# Patient Record
Sex: MALE | Race: WHITE
[De-identification: names, ages, dates, MRNs, and addresses within clinical notes are randomized per-mention and may not be internally consistent; named-entity substitution may affect disease eponyms.]

---

## 2018-06-18 NOTE — DIAGNOSTIC IMAGING REPORT
FOOT RIGHT COMPLETE - 3 views



HISTORY:  Pain. Bilaterally infected right.

COMPARISON: None available.

     

FINDINGS:

Bones:

No acute displaced fracture.  

Osseous alignment is within normal limits.

Plantar calcaneal enthesophyte.

Erosive changes of the great toe phalanx.



Joints:

The joint spaces are well-maintained.



Soft tissues:

Soft tissue defect in the great toe.





IMPRESSION: 

Erosive changes of the great toe phalanx with overlying soft tissue defect,

consistent with osteomyelitis.



Signed by: Dr. Guilherme Alicia M.D. on 6/18/2018 8:42 PM

## 2018-06-19 NOTE — CONSULTATION
DATE OF CONSULTATION:  June 19, 2018 



PODIATRY SURGERY CONSULTATION 



REASON FOR CONSULTATION:  Osteomyelitis right hallux. I am covering for Dr. Dinesh Muller.



HISTORY OF PRESENT ILLNESS:  Patient has been diabetic for many years. He 

says he has been noncompliant. He has been having daily drinks; so, it has 

really increased his hemoglobin A1c. He thinks it is probably running at 

around a 10. He says Dr. Marquez is his endocrinologist and Dr. Cardenas his 

PCP. He says he has not seen Dr. Cardenas in a long time. He usually follows 

up with Dr. Marquez, but he did miss the last appointment. Patient presented 

through the emergency room with an infected right hallux.



ALLERGIES:  NO KNOWN ALLERGIES.



MEDICATION:  Please refer to MAR. Of interest to this consult are Zosyn and 

vancomycin.



REVIEW OF SYSTEMS:  Noncontributory except for a full-thickness ulcer, bone 

exposed, distal phalanx to the right hallux.



LOWER EXTREMITY PHYSICAL EXAMINATION:  Pedal pulses are palpable. Capillary 

filling time is delayed. There is some pedal hair growth. It is scant. Skin 

is atrophic. Intrinsic-minus type of foot. Full-thickness ulcer to the 

distal aspect of the right foot with distal phalanx exposed. There are 

localized erythema and edema. No streaking erythema, no erythema, no 

ascending lymphangitis. At this point the base is 100% fibrotic. The bone 

is exposed. The wound appears soft to touch. There is malodor to the area. 

Protective threshold is absent. 



X-RAY 3 VIEWS:  Erosive changes of the great toe phalanx with overlying 

soft-tissue defect consistent with osteomyelitis.



ASSESSMENT

1. Ulcer grade 3, right hallux.

2. Diabetes with neuropathy.

3. Peripheral vascular disease.

4. History of noncompliance.



PLAN:   At this point I discussed with patient that he needs an amputation 

of the right distal phalanx of the hallux. He agrees with this. I discussed 

with him possible amputation on Thursday morning. He will be 

nonweightbearing. I discussed with him that normal healing times could take 

4 to 6 weeks for it to heal. He has to be compliant with his diabetes. He 

says he is going to stop drinking now. He is going to comply with getting 

his diabetes under control. I discussed no pressure to the area and 

compliance with antibiotics. I am tentatively going to put him for surgery 

for Thursday in the morning. He will be n.p.o. on Wednesday night.





 





DD:  06/19/2018 10:57

DT:  06/19/2018 15:01

Job#:  [id number]

## 2018-06-19 NOTE — DIAGNOSTIC IMAGING REPORT
PROCEDURE:

Frontal and lateral views of the chest.

 

COMPARISON: Patients [facility], CT, CTA CHEST W, 3/18/2012, 5:15.

 

INDICATIONS:   PREOP AMPUTATION OF 1ST DIGIT TOE

     

FINDINGS:

Lines/tubes:  None.

 

Lungs: The lungs are mildly hypoinflated. Linear opacity in the right 

middle lobe likely reflect subsegmental atelectasis. No consolidation 

or pulmonary edema.

 

Pleura:  There is no pleural effusion or pneumothorax.

 

Heart and mediastinum: Stable mild cardiomegaly. Pulmonary vasculature 

is normal.

 

Bones:  No acute bony abnormality. Degenerative changes in the thoracic 

spine.

 

IMPRESSION: 

 

1. mild cardiomegaly and mildly hypoinflated lungs, without acute 

cardiopulmonary disease.

 

 

Nam Hardin M.D.  

Dictated by:  Nam Hardin M.D. on 6/19/2018 at 16:20     

Electronically approved by:  Nam Hardin M.D. on 6/19/2018 at 

16:20

## 2018-06-19 NOTE — HISTORY AND PHYSICAL
CLINICAL HISTORY:  This is a 58-year-old white man known to me from 

previous evaluation, admitted via the emergency room because of right 1st 

toe osteomyelitis and cellulitis.



This patient is known to have coronary artery disease, status post previous 

anterior wall myocardial infarction.  Additionally, he has alcoholic 

cardiomyopathy.  At the time of cardiac catheterization in , his mid 

LAD was occluded, diagonal had 80% stenosis, circumflex had sequential 60 

and 80% stenosis, OM had 40% stenosis.  There were some plaques in the 

right coronary artery which were crooked and small.  He had 3+ mitral 

regurgitation.  Subsequently echocardiogram elsewhere apparently showed 

ejection fraction had dropped to 20%.  Nevertheless, the patient has gone 

off all his medications, Lasix, metoprolol, and lisinopril apparently 

because of low blood pressure.  He was taking digoxin instead.  However, 

according to him, he has not been taking any medications for approximately 

2 months including his Glucophage.  He does use this insulin long acting 

glargine-based 25 units twice a day.  He is to take 80 but apparently has 

decreased his intake.



He is a heavy drinker and continues to drink.  He also has been smoking for 

the past 2 years for a total of 17 years of smoking after quitting for a 

while.  He has history of hypertension, hyperlipidemia, but he does not 

take any medications now since his blood pressure is low.



PERSONAL SOCIAL HISTORY:  He was a chemistry major, working in the computer 

field. His wife had severe Alzheimer's.  He smokes and drinks.



FAMILY HISTORY:  Father  of myocardial infarction, age 43.  Mother had 

Alzheimer's.  Sister  in her sleep from sleep apnea or morbid obesity.  

Another sister is alive with diabetes.



REVIEW OF SYSTEMS:  Noncontributory.



PHYSICAL EXAMINATION

GENERAL:  He is alert and coherent.

VITAL SIGNS:  Stable.

CARDIAC:  Jugular veins are not distended.  S1 and S2 were regular.  There 

is no appreciable murmur.

LUNGS:  Clear.

ABDOMEN:  Soft.  Bowel sounds are present.

EXTREMITIES:  No cyanosis, clubbing, or edema.  He has a nonhealing chronic 

wound in his right toe, apparently has been ongoing for 1 month with 

surrounding cellulitis.



LABORATORY STUDIES:  The x-ray of the foot showed osteomyelitis.



IMPRESSIONS

1. Osteomyelitis of the right 1st toe with soft tissue breakdown for 

approximately a month with surrounding cellulitis.

2. Compensated congestive heart failure, ejection fraction in the range 

of 20%.  In 2011, it was in the range of 35 to 40%.

3. Ischemic and alcoholic cardiomyopathy.

4. Coronary artery disease status post occluded mid left anterior 

descending artery with additional disease in the distal circumflex as 

well as diagonal artery.  Not evaluated invasively since .

5. Moderately severe mitral regurgitation.

6. Diabetes.

7. Hyperlipidemia.

8. Hypotension with history of hypertension.

9. Noncompliance with medications.

10. Alcoholism with elevated liver function but reportedly liver biopsy 

was negative for cirrhosis.

11. Hyponatremia related to alcohol abuse.

12. The patient states that he has never had alcohol withdrawal symptoms 

when he stopped drinking in the past.



RECOMMENDATIONS

1.  Repeat echocardiogram.  If there is evidence of intracardiac thrombus 

or even if there is not, we will still consider anticoagulation.  However, 

the patient is unlikely to comply and his liver problems does not help.  In 

the past, there was concern about apical mural thrombus.  Anticoagulation, 

however, in this patient may be difficult due to his alcoholism and liver 

condition.

2.  He will require infectious disease and podiatry consultation.  The 

risks from the cardiac standpoint for the planned toe amputation is quite 

high due to his severe cardiomyopathy.  Nevertheless, he appears to be 

quite compensated and still working.  The risks from a cardiac standpoint 

have been explained to him and understood.  All questions were answered.  

Most likely, we will proceed with toe amputation.









DD:  2018 10:27

DT:  2018 10:39

Job#:  [id number]



cc:DYLAN GARRIDO DPM

## 2018-06-19 NOTE — CONSULTATION
DATE OF CONSULTATION:  June 19, 2018 



REASON FOR CONSULTATION:  Osteomyelitis.



Thank you, Dr. Villegas, for asking me to see this patient.



HISTORY:  The patient is a 58-year-old man referred for osteomyelitis.  He 

was admitted through the emergency department with cellulitis and 

osteomyelitis of the right great toe.  He presented to the emergency 

department on June 18, 2018, with black discoloration of the right great 

toe.  The patient sustained a cut of the tip of the right great toe about 1 

month ago.  The next morning when he woke up, he noted that his puppy had 

chewed off the skin from the wound.  The patient managed himself with 

hydrogen peroxide soaks and topical antibiotics.  Unfortunately, the wound 

deteriorated with black discoloration of the distal end of the right great 

toe associated with redness of the right forefoot.  The patient denies 

fever, chills and pain.  In the emergency department, he was noted to have 

a temperature of 98.3 degrees Fahrenheit, pulse 102, respiratory rate 20, 

blood pressure 156/85.  Right foot x-ray showed "erosive changes of the 

great toe phalanx" with overlying soft tissue defect consistent with 

osteomyelitis.  The patient was evaluated by the podiatry service, and 

surgery has been planned for Thursday.  



PAST MEDICAL HISTORY:  Diabetes mellitus, type 2, hypertension, 

hyperlipidemia, coronary artery disease, myocardial infarction, mitral 

regurgitation, alcoholic cardiomyopathy, ejection fraction 20%, and 

congestive heart failure.  



PAST SURGICAL HISTORY:  Excision of benign duodenal tumor.



ALLERGIES:  NO KNOWN DRUG ALLERGIES. 



MEDICATIONS:  The current antibiotics are Zosyn 3.375 g IVPB q.8 h. and 

vancomycin 1 g IVPB q.12 h.



IMMUNIZATIONS:  The patient had received tetanus vaccination within 10 

years.  He does not recall receiving pneumococcal vaccination. 



FAMILY HISTORY:  Noncontributory.



SOCIAL HISTORY:  The patient smokes cigarettes every day and vague about 

quantity.  Has been a chronic smoker with intermittent periods of 

cessation.  Also, he drinks alcohol heavily, but has stopped intermittently 

in the past.  



REVIEW OF SYSTEMS:  As per HPI.  



PHYSICAL EXAMINATION 

GENERAL:  No acute distress. 

VITAL SIGNS: T-max 99.3, pulse 85, respiratory rate 17, blood pressure 

122/80, weight 227 pounds.  

HEENT:  Normocephalic.  There is no icterus.  No injection of conjunctivae. 

 There is no ear or nasal discharge.  Moist oral mucosa.  No pharyngeal 

erythema elicited.

NECK:  Supple.  No lymphadenopathy or meningismus.

LUNGS:  Clear to auscultation bilaterally.

HEART:  Normal S1 and S2.  

ABDOMEN:  Soft and nontender.  

EXTREMITIES:  There is black discoloration of the distal phalanx of the 

right great toe with erythema extending from the proximal right great toe 

to the right forefoot.  Dorsalis pedis and posterior tibial pulses are weak 

to palpation in both feet.  There is trace edema of the right foot.  There 

is no edema, clubbing or cyanosis of the rest of the extremities.

SKIN:  As per extremities.  

CNS:  Awake, alert and oriented to person, place and time.  There is 

decreased sensation on examination of the feet bilaterally.  Nonfocal.





LABORATORY AND DIAGNOSTICS:  WBC is 5180, hemoglobin 15.4 and platelets 

173,000.  Neutrophils 67.5, lymphs 19.3, monos 9.3, eosinophils 2.7, 

basophils 0.8.  BUN 8, creatinine 0.95, AST 55, ALT 80, alk phos 59, total 

bilirubin 1.1, blood glucose 149.  



IMPRESSION 

1.  Diabetic foot infection present on admission.

2.  Cellulitis of the right great toe present on admission.

3.  Osteomyelitis of the distal phalanx of the right great toe present on 

admission.

4.  Abnormal liver enzymes.

5.  Questionable of peripheral arterial disease. 

6.  Alcoholic cardiomyopathy.

7.  Tobacco use disorder.

8.  Alcohol abuse.  



PLAN 

1.  I agree with planned surgery.

2.  Check wound culture and arterial Doppler ultrasound if not yet done. 

3.  Continue current antibiotics and administer Pneumovax.  Prevnar 13 is 

not available at this facility.  

4.  Smoking cessation counseling has been provided to the patient and the 

patient is currently on nicotine gum.  Also, the patient has been counseled 

to quit drinking alcohol.  



 





DD:  06/19/2018 18:53

DT:  06/19/2018 19:02

Job#:  [id number]

## 2018-06-21 NOTE — OPERATIVE REPORT
DATE OF PROCEDURE:  June 19, 2018 



ASSISTANT:  None



PREOPERATIVE DIAGNOSIS:  Osteomyelitis, right distal phalanx.



POSTOPERATIVE DIAGNOSIS:  Osteomyelitis, right distal phalanx.



PROCEDURES 

1.  Amputation of distal phalanx, right foot.

2.  Application of posterior splint.



PATHOLOGY:  Culture and sensitivity of the bone and tissue sent for 

pathology.



ANESTHESIA:  Local anesthesia with MAC.



HEMOSTASIS:  None.



ESTIMATED BLOOD LOSS:  10 mL.



MATERIALS:  None.



INJECTABLES:  0.5% Marcaine plain given preop.



COMPLICATIONS:  None.



CONDITION:  Stable. 



PROCEDURE IN DETAIL:  Under mild sedation, the patient was brought to the 

operating room and placed on the operating table in the supine position.  

Following IV sedation, IV anesthesia was obtained.  At this point, the 

right foot was scrubbed, prepped and draped in the usual aseptic manner.  

The leg was then lowered to the table.

Attention was then directed to the dorsal aspect of the right foot where 

the bone at the distal phalanx was exposed.  At this point, utilizing sharp 

technique, the IPJ was removed.  Culture and sensitivity of the bone were 

taken and was sent for pathology.  At this point, a plantar flap was then 

used to close the area after copious irrigation with Bacitracin.  The flap 

was then closed with 4-0 nylon.



Ointment, Adaptic, 4 x 4's, Kerlix, Webril, and posterior splint was 

applied, and was secured utilizing an Ace bandage.



The patient tolerated the procedure and anesthesia well without 

complications.  The patient was transported back to stepdown unit.  He will 

return back to his room.  At this point, he is not weightbearing to the 

lower extremity.  I discussed with his compliance.  I have counseled him on 

the use of alcohol use.  He is aware that this is going to impair his 

healing if he continues to drink and not compliant with his diabetes 

management.  If discharged, he will follow up in the office.  Plan to 

change the dressing in about 3-5 days.  









DD:  06/21/2018 09:10

DT:  06/21/2018 09:36

Job#:  [id number]

## 2018-06-21 NOTE — CARDIOLOGY REPORT
DATE OF STUDY:    



DOPPLER SCAN OF LOWER EXTREMITY ARTERIES



The lower extremity arteries bilaterally showed triphasic and biphasic 

waveforms throughout.  Velocity is elevated in the midleft posterior tibial 

artery at 3.3 meters per second consistent with high-grade stenosis. 



CONCLUSION

1.  High-grade stenosis involving the midleft posterior tibial artery with 

velocity of 3.3 meters per second.  

2.  No high-grade stenosis elsewhere bilaterally in the major arteries of 

the lower extremities.









DD:  06/21/2018 09:09

DT:  06/21/2018 09:15

Job#:  [id number]



cc:HAILY VILLALPANDO MD

## 2018-06-21 NOTE — CARDIOLOGY REPORT
DATE OF STUDY:    



ECHOCARDIOGRAM  



M-MODE:  Dilated left atrium and left ventricle.  Severely diminished left 

ventricular contractility.  [place] appears to be akinetic.  Mitral and aortic 

valves are grossly normal.  There is no pericardial effusion.



SECTOR SCAN:  Dilated left atrium and right ventricle.  Severely diminished 

left ventricular contractility.  [place] is akinetic.  Mitral, aortic and 

tricuspid valves are grossly normal.  There is no pericardial effusion.



CARDIAC DOPPLER STUDY WITH COLOR:  Trace mitral, pulmonic and tricuspid 

regurgitation.



CONCLUSION

1.  Dilated left ventricle with severely diminished left ventricular 

contractility with apex akinetic. Ejection fraction is approximately 20%.

2.  Trace mitral regurgitation with dilated left atrium.

3.  Trace tricuspid and pulmonic regurgitation.

4.  Suboptimal study.  









DD:  06/21/2018 09:08

DT:  06/21/2018 09:12

Job#:  [id number]

## 2018-06-22 NOTE — DISCHARGE SUMMARY
CLINICAL HISTORY: This is a 58-year-old white man known to me from previous 

evaluation admitted via the emergency room because of right 1st toe 

osteomyelitis and cellulitis.  Please refer to my previous dictation 

concerning details of current illness, past medical history, personal and 

social history, family history, review of systems, physical examination, 

initial laboratory studies.



HOSPITAL COURSE:  The patient was seen in consultation by infectious 

disease consultant, Dr. Dionte Harris and by podiatry, Dr. Edna Reyes.  

Surgery was recommended and was carried out without difficulties under MAC 

and local anesthesia.  His ejection fraction was 25% by echo making him 

high risk.  His is known to have coronary artery disease, as well as 

possible alcoholic cardiomyopathy.  The cultures grew MRSA and E. coli.  

The patient was treated with vancomycin, piperacillin/tazobactam 

combination.  He never had fever or elevated white count with white count 

around 5000 range.  He is anxious to go home.  He is discharged with 

approval of infectious disease and podiatry to be followed on an outpatient 

basis.  Antibiotic outpatient coverage will be given by Dr. Dionte Harris.  



DISCHARGE DIAGNOSES 

1.  Osteomyelitis and cellulitis on the right 1st toe successfully treated 

with toe amputation under local anesthesia and monitored anesthesia care, 

and with intravenous antibiotics using vancomycin and piperacillin and 

tazobactam.  Bacteria was methicillin-resistant Staphylococcus 

aureus/Escherichia coli.

2.  Compensated congestive heart failure with ejection fraction of 25% with 

previous ejection fraction in 2011 of 35% to 40%.

3.  Ischemic and alcoholic cardiomyopathy.

4.  Coronary artery disease, status post occluded midleft ventricular 

descending coronary artery with additional disease in the distal circumflex 

and diagonal artery treated medically since 2011.

5.  Moderately severe mitral regurgitation.

6.  Diabetes.  

7.  Hyperlipidemia.

8.  Hypotension.

9.  Noncompliance with medications and followup.

10. Alcoholism with elevated liver function with AST of 71, ALT of 99.

11. Hyponatremia due to alcoholism with original sodium 129.  At discharge, 

sodium was 137.  

12. No evidence of alcohol withdrawal as the patient had predicted when he 

stops drinking alcohol.  





 



 _________________________________

MIKAYLA YAÑEZ MD



DD:  06/22/2018 10:19

DT:  06/22/2018 10:40

Job#:  [id number]



cc:GERMAINE DIOP M.D.

## 2018-07-16 ENCOUNTER — HOSPITAL ENCOUNTER (INPATIENT)
Dept: HOSPITAL 88 - ER | Age: 58
LOS: 10 days | Discharge: HOME HEALTH | DRG: 617 | End: 2018-07-26
Attending: INTERNAL MEDICINE | Admitting: INTERNAL MEDICINE
Payer: COMMERCIAL

## 2018-07-16 VITALS — DIASTOLIC BLOOD PRESSURE: 75 MMHG | SYSTOLIC BLOOD PRESSURE: 130 MMHG

## 2018-07-16 VITALS — BODY MASS INDEX: 32.9 KG/M2 | WEIGHT: 235 LBS | HEIGHT: 71 IN

## 2018-07-16 DIAGNOSIS — E11.621: ICD-10-CM

## 2018-07-16 DIAGNOSIS — E11.65: ICD-10-CM

## 2018-07-16 DIAGNOSIS — I25.2: ICD-10-CM

## 2018-07-16 DIAGNOSIS — I42.6: ICD-10-CM

## 2018-07-16 DIAGNOSIS — I34.0: ICD-10-CM

## 2018-07-16 DIAGNOSIS — E11.69: Primary | ICD-10-CM

## 2018-07-16 DIAGNOSIS — Z16.24: ICD-10-CM

## 2018-07-16 DIAGNOSIS — E11.51: ICD-10-CM

## 2018-07-16 DIAGNOSIS — F17.210: ICD-10-CM

## 2018-07-16 DIAGNOSIS — L97.412: ICD-10-CM

## 2018-07-16 DIAGNOSIS — I11.0: ICD-10-CM

## 2018-07-16 DIAGNOSIS — I25.10: ICD-10-CM

## 2018-07-16 DIAGNOSIS — I50.9: ICD-10-CM

## 2018-07-16 DIAGNOSIS — E78.5: ICD-10-CM

## 2018-07-16 DIAGNOSIS — Z91.14: ICD-10-CM

## 2018-07-16 DIAGNOSIS — Z79.4: ICD-10-CM

## 2018-07-16 DIAGNOSIS — B96.89: ICD-10-CM

## 2018-07-16 DIAGNOSIS — B95.62: ICD-10-CM

## 2018-07-16 DIAGNOSIS — Z98.61: ICD-10-CM

## 2018-07-16 DIAGNOSIS — Z89.411: ICD-10-CM

## 2018-07-16 DIAGNOSIS — F10.20: ICD-10-CM

## 2018-07-16 DIAGNOSIS — L03.115: ICD-10-CM

## 2018-07-16 DIAGNOSIS — B95.2: ICD-10-CM

## 2018-07-16 DIAGNOSIS — M86.171: ICD-10-CM

## 2018-07-16 LAB
ALBUMIN SERPL-MCNC: 2.9 G/DL (ref 3.5–5)
ALBUMIN/GLOB SERPL: 0.7 {RATIO} (ref 0.8–2)
ALP SERPL-CCNC: 63 IU/L (ref 40–150)
ALT SERPL-CCNC: 30 IU/L (ref 0–55)
ANION GAP SERPL CALC-SCNC: 18.7 MMOL/L (ref 8–16)
BASOPHILS # BLD AUTO: 0 10*3/UL (ref 0–0.1)
BASOPHILS NFR BLD AUTO: 0.2 % (ref 0–1)
BUN SERPL-MCNC: 10 MG/DL (ref 7–26)
BUN/CREAT SERPL: 11 (ref 6–25)
CALCIUM SERPL-MCNC: 8.9 MG/DL (ref 8.4–10.2)
CHLORIDE SERPL-SCNC: 87 MMOL/L (ref 98–107)
CO2 SERPL-SCNC: 20 MMOL/L (ref 22–29)
DEPRECATED NEUTROPHILS # BLD AUTO: 10.4 10*3/UL (ref 2.1–6.9)
EGFRCR SERPLBLD CKD-EPI 2021: > 60 ML/MIN (ref 60–?)
EOSINOPHIL # BLD AUTO: 0 10*3/UL (ref 0–0.4)
EOSINOPHIL NFR BLD AUTO: 0.1 % (ref 0–6)
ERYTHROCYTE [DISTWIDTH] IN CORD BLOOD: 13.2 % (ref 11.7–14.4)
GLOBULIN PLAS-MCNC: 4.3 G/DL (ref 2.3–3.5)
GLUCOSE SERPLBLD-MCNC: 131 MG/DL (ref 74–118)
HCT VFR BLD AUTO: 37.3 % (ref 38.2–49.6)
HGB BLD-MCNC: 13.4 G/DL (ref 14–18)
LYMPHOCYTES # BLD: 0.6 10*3/UL (ref 1–3.2)
LYMPHOCYTES NFR BLD AUTO: 4.7 % (ref 18–39.1)
MCH RBC QN AUTO: 31.1 PG (ref 28–32)
MCHC RBC AUTO-ENTMCNC: 35.9 G/DL (ref 31–35)
MCV RBC AUTO: 86.5 FL (ref 81–99)
MONOCYTES # BLD AUTO: 1.2 10*3/UL (ref 0.2–0.8)
MONOCYTES NFR BLD AUTO: 9.8 % (ref 4.4–11.3)
NEUTS SEG NFR BLD AUTO: 84.5 % (ref 38.7–80)
PLATELET # BLD AUTO: 155 X10E3/UL (ref 140–360)
POTASSIUM SERPL-SCNC: 3.7 MMOL/L (ref 3.5–5.1)
RBC # BLD AUTO: 4.31 X10E6/UL (ref 4.3–5.7)
SODIUM SERPL-SCNC: 122 MMOL/L (ref 136–145)

## 2018-07-16 PROCEDURE — 80053 COMPREHEN METABOLIC PANEL: CPT

## 2018-07-16 PROCEDURE — 97605 NEG PRS WND THER DME<=50SQCM: CPT

## 2018-07-16 PROCEDURE — 87186 SC STD MICRODIL/AGAR DIL: CPT

## 2018-07-16 PROCEDURE — 96372 THER/PROPH/DIAG INJ SC/IM: CPT

## 2018-07-16 PROCEDURE — 88311 DECALCIFY TISSUE: CPT

## 2018-07-16 PROCEDURE — 81001 URINALYSIS AUTO W/SCOPE: CPT

## 2018-07-16 PROCEDURE — 99284 EMERGENCY DEPT VISIT MOD MDM: CPT

## 2018-07-16 PROCEDURE — 36569 INSJ PICC 5 YR+ W/O IMAGING: CPT

## 2018-07-16 PROCEDURE — 71046 X-RAY EXAM CHEST 2 VIEWS: CPT

## 2018-07-16 PROCEDURE — 87205 SMEAR GRAM STAIN: CPT

## 2018-07-16 PROCEDURE — 87102 FUNGUS ISOLATION CULTURE: CPT

## 2018-07-16 PROCEDURE — 82948 REAGENT STRIP/BLOOD GLUCOSE: CPT

## 2018-07-16 PROCEDURE — 93005 ELECTROCARDIOGRAM TRACING: CPT

## 2018-07-16 PROCEDURE — 87071 CULTURE AEROBIC QUANT OTHER: CPT

## 2018-07-16 PROCEDURE — 80202 ASSAY OF VANCOMYCIN: CPT

## 2018-07-16 PROCEDURE — 80048 BASIC METABOLIC PNL TOTAL CA: CPT

## 2018-07-16 PROCEDURE — 82805 BLOOD GASES W/O2 SATURATION: CPT

## 2018-07-16 PROCEDURE — 87040 BLOOD CULTURE FOR BACTERIA: CPT

## 2018-07-16 PROCEDURE — 97607 NEG PRS WND THR NDME<=50SQCM: CPT

## 2018-07-16 PROCEDURE — 71045 X-RAY EXAM CHEST 1 VIEW: CPT

## 2018-07-16 PROCEDURE — 87206 SMEAR FLUORESCENT/ACID STAI: CPT

## 2018-07-16 PROCEDURE — 0Y6M0Z9 DETACHMENT AT RIGHT FOOT, PARTIAL 1ST RAY, OPEN APPROACH: ICD-10-PCS | Performed by: PODIATRIST

## 2018-07-16 PROCEDURE — 83605 ASSAY OF LACTIC ACID: CPT

## 2018-07-16 PROCEDURE — 88305 TISSUE EXAM BY PATHOLOGIST: CPT

## 2018-07-16 PROCEDURE — 85025 COMPLETE CBC W/AUTO DIFF WBC: CPT

## 2018-07-16 PROCEDURE — 87075 CULTR BACTERIA EXCEPT BLOOD: CPT

## 2018-07-16 PROCEDURE — 36415 COLL VENOUS BLD VENIPUNCTURE: CPT

## 2018-07-16 PROCEDURE — 97139 UNLISTED THERAPEUTIC PX: CPT

## 2018-07-16 PROCEDURE — 36600 WITHDRAWAL OF ARTERIAL BLOOD: CPT

## 2018-07-16 PROCEDURE — 87086 URINE CULTURE/COLONY COUNT: CPT

## 2018-07-16 PROCEDURE — 93925 LOWER EXTREMITY STUDY: CPT

## 2018-07-16 RX ADMIN — SODIUM CHLORIDE SCH MLS/HR: 9 INJECTION, SOLUTION INTRAVENOUS at 18:00

## 2018-07-16 RX ADMIN — AMLODIPINE BESYLATE SCH MG: 5 TABLET ORAL at 21:05

## 2018-07-16 RX ADMIN — INSULIN LISPRO SCH UNIT: 100 INJECTION, SOLUTION INTRAVENOUS; SUBCUTANEOUS at 20:56

## 2018-07-16 RX ADMIN — TAZOBACTAM SODIUM AND PIPERACILLIN SODIUM SCH MLS/HR: 375; 3 INJECTION, SOLUTION INTRAVENOUS at 23:41

## 2018-07-16 NOTE — HISTORY AND PHYSICAL
CLINICAL HISTORY:  This is a 58-year-old white man known to me from 

previous evaluations admitted via the emergency room because of recurrent 

right toe cellulitis extending into the foot.



This patient was hospitalized 2018 with osteomyelitis and cellulitis 

of the 1st toe.  He underwent successful toe amputation by Dr. Reyes and 

was seen by Dr. Dionte Harris of infectious disease.  He was released on 

oral antibiotics to be taken for 20 days, on 18.  However, the 

patient states that for at least 10 days he has not been taking any 

antibiotics. He says he has seen Dr. Reyes twice and she was satisfied with 

his healing.  Additionally, home health has been coming twice a week to his 

home and changed the bandage apparently without noticing any problems.  Two 

days prior to admission he noticed a small pimple in the right toe and then 

this morning he noticed it was infected.   He denies any fever or chills.  

He decided to come to the emergency room and was found to have fulminant 

infection with abscess drainage.  He is being admitted for further 

evaluation and treatment.



PAST MEDICAL HISTORY:  Remarkable for coronary artery disease and cardiac 

catheterization in  showed occluded mid LAD with collaterals, 80% 

proximal diagonal artery stenosis and 80% distal circumflex stenosis.  

Ejection fraction was 40% and prior to that his ejection fraction has been 

as low as 20%, thought to be due to alcohol as well as ischemic 

cardiomyopathy.  He has history of diabetes, hypertension, hypotension, 

hyperlipidemia and noncompliance as well as moderate mitral regurgitation 

which may be exacerbated by catheter positioning. 



HOME MEDICATIONS:  Amlodipine 5 mg b.i.d., (?) lisinopril, (?) digoxin, 

metformin as well as metoprolol succinate, Lasix and potassium.



PERSONAL/SOCIAL HISTORY:  He is a heavy drinker, 10-20 beers per day.  He 

continues to smoke 1 to 2 packs per day.  He works as a computer programer  

He has a degree in chemistry.  His wife lives at home with severe Alzheimer 

disease.  He has a full-time live-in aide.  



FAMILY HISTORY:  Father  from myocardial infarction at age 43.  Mother 

had Alzheimer disease.  Sister  in her sleep from sleep apnea and 

morbid obesity.  Another sister is alive with diabetes.  



REVIEW OF SYSTEMS:  Noncontributory. 



PHYSICAL EXAMINATION:   

VITAL SIGNS:  Stable.  Temperature high was 100.3 at the time of 

presentation.  Blood pressure 135/75, pulse oximetry 98%.  Pulse 117.  

CARDIOVASCULAR:  Jugular veins are not distended.  S1 and S2 were regular.  

There no appreciable murmurs. 

RESPIRATORY:  Clear. 

ABDOMEN:  Soft.  Bowel sounds present. 

EXTREMITIES:  No cyanosis, clubbing or edema. 



LABORATORY STUDIES:  White count 4300, hemoglobin 13.4, platelet count 

155,000.  Sodium is 122.  Potassium 3.7.  Bicarb 20.  BUN 10.  Creatinine 

0.82, albumin 2.9, lactic acid 13.3.  Bilirubin 2.0.  Albumin 2.9.  



IMPRESSION:  

1. Severe cellulitis with drainage, possible recurrent osteomyelitis, 

possible abscess.  Status post amputation around 2018. 

2. Problem of medication noncompliance.

3. Alcoholism.

4. Cigarette smoking.

5. History of alcoholic and ischemic cardiomyopathy with ejection 

fraction in the range of 20%, most recently 35% to 40%.

6. Coronary artery disease with cardiac catheterization in  showing 

occluded mid LAD with 80% diagonal artery stenosis and 80% distal 

circumflex stenosis, treated medically.

7. Moderately severe mitral regurgitation.

8. Diabetes.

9. Hypertension.

10. History of hypotension.

11. Elevated bilirubin 2.0 related to alcohol abuse.  

12. Hyponatremia related to alcohol usage. 



RECOMMENDATIONS:  Podiatry consultation with Dr. Reyes.  Infectious disease 

consultation with Dr. Dionte Harris.  This patient may require a more 

prolonged hospitalization at this time. 







DD:  2018 18:14

DT:  2018 18:30

Job#:  [id number] 



cc:GERMAINE DIOP MD

## 2018-07-16 NOTE — DIAGNOSTIC IMAGING REPORT
PROCEDURE:X-RAY RIGHT FOOT, COMPLETE

 

COMPARISON:6/18/18

 

INDICATIONS:OSTEOMYELITIS, RIGHT GREAT TOE INFECTION

 

FINDINGS:

Status post right great toe amputation at the level of distal 

interphalangeal joint. Mild soft tissue swelling and minimal emphysema 

at the surgical site. Mild soft tissue swelling of dorsal forefoot.

No acute fracture or dislocation. Small plantar calcaneal enthesophyte.

 

No periosteal reaction or erosion on today's exam.

 

CONCLUSION:

Postsurgical changes of right great toe amputation at the level of 

interphalangeal joint.

 

Dictated by:  Layton Gomez M.D. on 7/16/2018 at 15:42     

Electronically approved by:  Layton Gomez M.D. on 7/16/2018 at 15:42

## 2018-07-16 NOTE — DIAGNOSTIC IMAGING REPORT
PROCEDURE:

Frontal and lateral views of the chest.

 

COMPARISON: 6/19/18

 

INDICATIONS:   OSTEOMYELITIS, RIGHT GREAT TOE INFECTION

     

FINDINGS:

Lines/tubes:  None.

 

Lungs: Limited by low lung volumes and body habitus. Mild central 

vascular congestion, accentuated by low lung volumes.

 

Pleura:  There is no pleural effusion or pneumothorax.

 

Heart and mediastinum:  The cardiac silhouette is enlarged, accentuated 

by low lung volumes.

 

Bones:  No acute bony abnormality. Degenerative changes of thoracic 

spine.

 

IMPRESSION: 

 

Very limited study due to low lung volumes and body habitus.

Enlarged cardiac silhouette and mild central vascular congestion, 

accentuated by low lung volumes.

 

Dictated by:  Layton Gomez M.D. on 7/16/2018 at 15:48     

Electronically approved by:  Layton Gomez M.D. on 7/16/2018 at 15:48

## 2018-07-17 VITALS — SYSTOLIC BLOOD PRESSURE: 126 MMHG | DIASTOLIC BLOOD PRESSURE: 72 MMHG

## 2018-07-17 VITALS — DIASTOLIC BLOOD PRESSURE: 72 MMHG | SYSTOLIC BLOOD PRESSURE: 126 MMHG

## 2018-07-17 VITALS — SYSTOLIC BLOOD PRESSURE: 112 MMHG | DIASTOLIC BLOOD PRESSURE: 67 MMHG

## 2018-07-17 VITALS — SYSTOLIC BLOOD PRESSURE: 174 MMHG | DIASTOLIC BLOOD PRESSURE: 79 MMHG

## 2018-07-17 VITALS — SYSTOLIC BLOOD PRESSURE: 119 MMHG | DIASTOLIC BLOOD PRESSURE: 66 MMHG

## 2018-07-17 VITALS — SYSTOLIC BLOOD PRESSURE: 126 MMHG | DIASTOLIC BLOOD PRESSURE: 76 MMHG

## 2018-07-17 LAB
ALBUMIN SERPL-MCNC: 2.4 G/DL (ref 3.5–5)
ALBUMIN/GLOB SERPL: 0.6 {RATIO} (ref 0.8–2)
ALP SERPL-CCNC: 55 IU/L (ref 40–150)
ALT SERPL-CCNC: 26 IU/L (ref 0–55)
ANION GAP SERPL CALC-SCNC: 14.7 MMOL/L (ref 8–16)
BASOPHILS # BLD AUTO: 0 10*3/UL (ref 0–0.1)
BASOPHILS NFR BLD AUTO: 0.4 % (ref 0–1)
BUN SERPL-MCNC: 11 MG/DL (ref 7–26)
BUN/CREAT SERPL: 13 (ref 6–25)
CALCIUM SERPL-MCNC: 8.4 MG/DL (ref 8.4–10.2)
CHLORIDE SERPL-SCNC: 93 MMOL/L (ref 98–107)
CO2 SERPL-SCNC: 23 MMOL/L (ref 22–29)
DEPRECATED NEUTROPHILS # BLD AUTO: 7.6 10*3/UL (ref 2.1–6.9)
EGFRCR SERPLBLD CKD-EPI 2021: > 60 ML/MIN (ref 60–?)
EOSINOPHIL # BLD AUTO: 0.1 10*3/UL (ref 0–0.4)
EOSINOPHIL NFR BLD AUTO: 0.9 % (ref 0–6)
ERYTHROCYTE [DISTWIDTH] IN CORD BLOOD: 13.2 % (ref 11.7–14.4)
GLOBULIN PLAS-MCNC: 3.8 G/DL (ref 2.3–3.5)
GLUCOSE SERPLBLD-MCNC: 186 MG/DL (ref 74–118)
HCT VFR BLD AUTO: 34.3 % (ref 38.2–49.6)
HGB BLD-MCNC: 12.2 G/DL (ref 14–18)
LYMPHOCYTES # BLD: 0.8 10*3/UL (ref 1–3.2)
LYMPHOCYTES NFR BLD AUTO: 8 % (ref 18–39.1)
MCH RBC QN AUTO: 31 PG (ref 28–32)
MCHC RBC AUTO-ENTMCNC: 35.6 G/DL (ref 31–35)
MCV RBC AUTO: 87.3 FL (ref 81–99)
MONOCYTES # BLD AUTO: 1 10*3/UL (ref 0.2–0.8)
MONOCYTES NFR BLD AUTO: 10.4 % (ref 4.4–11.3)
NEUTS SEG NFR BLD AUTO: 79.6 % (ref 38.7–80)
PLATELET # BLD AUTO: 158 X10E3/UL (ref 140–360)
POTASSIUM SERPL-SCNC: 3.7 MMOL/L (ref 3.5–5.1)
RBC # BLD AUTO: 3.93 X10E6/UL (ref 4.3–5.7)
SODIUM SERPL-SCNC: 127 MMOL/L (ref 136–145)

## 2018-07-17 RX ADMIN — FUROSEMIDE SCH MG: 40 TABLET ORAL at 08:45

## 2018-07-17 RX ADMIN — POTASSIUM CHLORIDE SCH MEQ: 1500 TABLET, EXTENDED RELEASE ORAL at 08:45

## 2018-07-17 RX ADMIN — INSULIN LISPRO SCH UNIT: 100 INJECTION, SOLUTION INTRAVENOUS; SUBCUTANEOUS at 21:02

## 2018-07-17 RX ADMIN — TAZOBACTAM SODIUM AND PIPERACILLIN SODIUM SCH MLS/HR: 375; 3 INJECTION, SOLUTION INTRAVENOUS at 05:25

## 2018-07-17 RX ADMIN — NICOTINE SCH MG: 14 PATCH, EXTENDED RELEASE TRANSDERMAL at 14:41

## 2018-07-17 RX ADMIN — SODIUM CHLORIDE SCH MLS/HR: 9 INJECTION, SOLUTION INTRAVENOUS at 13:30

## 2018-07-17 RX ADMIN — VANCOMYCIN HYDROCHLORIDE SCH MLS/HR: 1 INJECTION, SOLUTION INTRAVENOUS at 03:42

## 2018-07-17 RX ADMIN — INSULIN LISPRO SCH UNIT: 100 INJECTION, SOLUTION INTRAVENOUS; SUBCUTANEOUS at 08:00

## 2018-07-17 RX ADMIN — Medication PRN MG: at 01:17

## 2018-07-17 RX ADMIN — SODIUM CHLORIDE PRN MG: 900 INJECTION INTRAVENOUS at 22:50

## 2018-07-17 RX ADMIN — SODIUM CHLORIDE PRN MG: 900 INJECTION INTRAVENOUS at 01:18

## 2018-07-17 RX ADMIN — AMLODIPINE BESYLATE SCH MG: 5 TABLET ORAL at 08:45

## 2018-07-17 RX ADMIN — Medication PRN MG: at 22:50

## 2018-07-17 RX ADMIN — CHLORDIAZEPOXIDE HYDROCHLORIDE PRN MG: 25 CAPSULE ORAL at 14:41

## 2018-07-17 RX ADMIN — VANCOMYCIN HYDROCHLORIDE SCH MLS/HR: 1 INJECTION, SOLUTION INTRAVENOUS at 16:00

## 2018-07-17 RX ADMIN — INSULIN LISPRO SCH UNIT: 100 INJECTION, SOLUTION INTRAVENOUS; SUBCUTANEOUS at 12:00

## 2018-07-17 RX ADMIN — AMLODIPINE BESYLATE SCH MG: 5 TABLET ORAL at 17:18

## 2018-07-17 RX ADMIN — INSULIN LISPRO SCH UNIT: 100 INJECTION, SOLUTION INTRAVENOUS; SUBCUTANEOUS at 17:18

## 2018-07-17 RX ADMIN — METFORMIN HYDROCHLORIDE SCH MG: 850 TABLET, FILM COATED ORAL at 08:45

## 2018-07-17 RX ADMIN — TAZOBACTAM SODIUM AND PIPERACILLIN SODIUM SCH MLS/HR: 375; 3 INJECTION, SOLUTION INTRAVENOUS at 13:00

## 2018-07-17 RX ADMIN — TAZOBACTAM SODIUM AND PIPERACILLIN SODIUM SCH MLS/HR: 375; 3 INJECTION, SOLUTION INTRAVENOUS at 18:46

## 2018-07-17 RX ADMIN — CHLORDIAZEPOXIDE HYDROCHLORIDE PRN MG: 25 CAPSULE ORAL at 01:18

## 2018-07-17 NOTE — CONSULTATION
DATE OF CONSULTATION:  July 17, 2018 



INFECTIOUS DISEASE CONSULTATION 



ATTENDING PHYSICIAN:  Fernandez Villegas MD 



REASON FOR CONSULTATION:  Right foot cellulitis.



Thank you, Dr. Villegas, for asking me to see this patient.  



HISTORY:  The patient is a 58-year-old man referred for right foot 

cellulitis.  He presented to the emergency department with right foot 

redness and swelling for 2 days.  The patient was admitted last month with 

right diabetic foot infection with osteomyelitis of the distal phalanx of 

the right great toe.  He successfully underwent an amputation of the distal 

phalanx of the right great toe on 6/21/2018.  The wound culture grew 

methicillin-resistant Staphylococcus aureus and Escherichia coli.  The 

patient improved with management and was successfully discharged to home on 

Bactrim DS.  He stated that he completed the antibiotic as prescribed and 

was responding to local wound care until 2 days prior to admission when he 

noted slight redness.  When the home health nurse came back to him 

yesterday, the right foot was swollen and markedly red with drainage.  The 

patient did not feel pain or feverish.  In the emergency room, he was noted 

to have temperature of 100.3 degrees Fahrenheit, pulse 117, respiratory 

rate 22 and blood pressure 135/73.  Initial laboratory studies showed blood 

leukocyte count of 12,300 with 84.5% neutrophils and blood glucose 220.  

Right foot x-ray showed postsurgical changes of the right great toe at the 

level of the interphalangeal joint. 



PAST MEDICAL HISTORY:  Diabetes mellitus type 2, hypertension, 

hyperlipidemia, coronary artery disease, myocardial infarction, mitral 

regurgitation, alcoholic cardiomyopathy (ejection fraction 20%) and 

congestive heart failure.



PAST SURGICAL HISTORY:  Excision of benign duodenal tumor. 



ALLERGIES:  NO KNOWN DRUG ALLERGIES.



MEDICATIONS:  The current antibiotics are Zosyn 3.375 grams IV piggyback 

q.6 h. and Bactrim 1 gram IV piggyback q.12 h.



IMMUNIZATIONS:   Patient has received a tetanus vaccine within 10 years.



FAMILY HISTORY:  Noncontributory.



SOCIAL HISTORY:  Patient smokes cigarettes every day and vague about the 

quantity.  He has been a chronic smoker with intermittent periods of 

cessation.  Also, he drinks alcohol daily but has stopped intermittently in 

the past.



REVIEW OF SYSTEMS:  As per history of present illness.  

 

PHYSICAL EXAMINATION

VITAL SIGNS:  T-max 100.3, pulse 89, respiratory rate 16, blood pressure 

119/66, weight 227 pounds. 

GENERAL:  No acute distress.

HEENT:  Normocephalic.  There is no icterus or injection of conjunctivae.  

There is no ear or nasal discharge.  Moist oral mucosa.  No pharyngeal 

erythema or exudate. 

NECK:  Supple.  No lymphadenopathy or meningismus. 

LUNGS:  Good air entry bilaterally. 

HEART:  Normal S1 and S2, regular. 

ABDOMEN:  Soft, nontender. 

EXTREMITIES:  There is amputation of the distal phalanx of the right great 

toe with black discoloration at the suture line and pus at the proximal 

aspect.  There are erythema and edema of the right foot extending medially 

to the lower third of the right leg.  The dorsalis pedis and posterior 

tibial pulses are weak to palpation.  

SKIN:  As per extremities.  

CNS:  Awake, alert and oriented to person, place and time.  There is 

decreased sensation on monofilament examination of the feet bilaterally.  

Nonfocal. 



LABORATORY AND DIAGNOSTICS:  WBC 9610, hemoglobin 12.2, platelets 158,000, 

neutrophils 79.6, lymphs 8, monos 10.4, eosinophils 0.9, basophils 0.4, BUN 

11, creatinine 0.83, blood glucose 193.  Blood culture collected in the 

emergency room is pending.



IMPRESSION

1. Right diabetic foot infection/abscess.

2. Cellulitis of right foot.

3. Peripheral arterial disease.

4. Diabetes mellitus, type 2, uncontrolled.

5. Alcoholic cardiomyopathy.

6. Tobacco use disorder.

7. Alcohol abuse.



PLAN 

1. Check right great toe pus aspirate culture and sensitivity.

2. Await podiatry consult.

3. Continue current antibiotics.  

4. Smoking cessation counseling was provided to patient.



 





DD:  07/17/2018 10:41

DT:  07/17/2018 11:10

Job#:  [id number]

## 2018-07-18 VITALS — DIASTOLIC BLOOD PRESSURE: 61 MMHG | SYSTOLIC BLOOD PRESSURE: 106 MMHG

## 2018-07-18 VITALS — SYSTOLIC BLOOD PRESSURE: 127 MMHG | DIASTOLIC BLOOD PRESSURE: 75 MMHG

## 2018-07-18 VITALS — SYSTOLIC BLOOD PRESSURE: 124 MMHG | DIASTOLIC BLOOD PRESSURE: 84 MMHG

## 2018-07-18 VITALS — SYSTOLIC BLOOD PRESSURE: 117 MMHG | DIASTOLIC BLOOD PRESSURE: 71 MMHG

## 2018-07-18 VITALS — SYSTOLIC BLOOD PRESSURE: 152 MMHG | DIASTOLIC BLOOD PRESSURE: 89 MMHG

## 2018-07-18 VITALS — SYSTOLIC BLOOD PRESSURE: 119 MMHG | DIASTOLIC BLOOD PRESSURE: 69 MMHG

## 2018-07-18 LAB
BACTERIA URNS QL MICRO: (no result) /HPF
BILIRUB UR QL: NEGATIVE
CLARITY UR: CLEAR
COLOR UR: (no result)
DEPRECATED RBC URNS MANUAL-ACNC: (no result) /HPF (ref 0–5)
EPI CELLS URNS QL MICRO: (no result) /LPF
KETONES UR QL STRIP.AUTO: (no result)
LEUKOCYTE ESTERASE UR QL STRIP.AUTO: NEGATIVE
NITRITE UR QL STRIP.AUTO: NEGATIVE
PROT UR QL STRIP.AUTO: NEGATIVE
SP GR UR STRIP: 1.01 (ref 1.01–1.02)
UROBILINOGEN UR STRIP-MCNC: 8 MG/DL (ref 0.2–1)
WBC #/AREA URNS HPF: (no result) /HPF (ref 0–5)

## 2018-07-18 RX ADMIN — SODIUM CHLORIDE SCH MLS/HR: 9 INJECTION, SOLUTION INTRAVENOUS at 09:30

## 2018-07-18 RX ADMIN — CHLORDIAZEPOXIDE HYDROCHLORIDE PRN MG: 25 CAPSULE ORAL at 08:13

## 2018-07-18 RX ADMIN — VANCOMYCIN HYDROCHLORIDE SCH MLS/HR: 1 INJECTION, SOLUTION INTRAVENOUS at 14:25

## 2018-07-18 RX ADMIN — CHLORDIAZEPOXIDE HYDROCHLORIDE PRN MG: 25 CAPSULE ORAL at 19:26

## 2018-07-18 RX ADMIN — AMLODIPINE BESYLATE SCH MG: 5 TABLET ORAL at 08:06

## 2018-07-18 RX ADMIN — VANCOMYCIN HYDROCHLORIDE SCH MLS/HR: 1 INJECTION, SOLUTION INTRAVENOUS at 02:28

## 2018-07-18 RX ADMIN — AMLODIPINE BESYLATE SCH MG: 5 TABLET ORAL at 17:04

## 2018-07-18 RX ADMIN — FUROSEMIDE SCH MG: 40 TABLET ORAL at 08:06

## 2018-07-18 RX ADMIN — SODIUM CHLORIDE SCH MLS/HR: 9 INJECTION, SOLUTION INTRAVENOUS at 14:26

## 2018-07-18 RX ADMIN — TAZOBACTAM SODIUM AND PIPERACILLIN SODIUM SCH MLS/HR: 375; 3 INJECTION, SOLUTION INTRAVENOUS at 05:29

## 2018-07-18 RX ADMIN — TAZOBACTAM SODIUM AND PIPERACILLIN SODIUM SCH MLS/HR: 375; 3 INJECTION, SOLUTION INTRAVENOUS at 12:14

## 2018-07-18 RX ADMIN — INSULIN LISPRO SCH UNIT: 100 INJECTION, SOLUTION INTRAVENOUS; SUBCUTANEOUS at 21:00

## 2018-07-18 RX ADMIN — METFORMIN HYDROCHLORIDE SCH MG: 850 TABLET, FILM COATED ORAL at 08:05

## 2018-07-18 RX ADMIN — INSULIN LISPRO SCH UNIT: 100 INJECTION, SOLUTION INTRAVENOUS; SUBCUTANEOUS at 12:14

## 2018-07-18 RX ADMIN — Medication PRN MG: at 11:31

## 2018-07-18 RX ADMIN — INSULIN LISPRO SCH UNIT: 100 INJECTION, SOLUTION INTRAVENOUS; SUBCUTANEOUS at 17:04

## 2018-07-18 RX ADMIN — POTASSIUM CHLORIDE SCH MEQ: 1500 TABLET, EXTENDED RELEASE ORAL at 08:06

## 2018-07-18 RX ADMIN — INSULIN LISPRO SCH UNIT: 100 INJECTION, SOLUTION INTRAVENOUS; SUBCUTANEOUS at 08:07

## 2018-07-18 RX ADMIN — TAZOBACTAM SODIUM AND PIPERACILLIN SODIUM SCH MLS/HR: 375; 3 INJECTION, SOLUTION INTRAVENOUS at 17:04

## 2018-07-18 RX ADMIN — TAZOBACTAM SODIUM AND PIPERACILLIN SODIUM SCH MLS/HR: 375; 3 INJECTION, SOLUTION INTRAVENOUS at 00:30

## 2018-07-18 RX ADMIN — NICOTINE SCH MG: 14 PATCH, EXTENDED RELEASE TRANSDERMAL at 08:06

## 2018-07-18 NOTE — CONSULTATION
DATE OF CONSULTATION:  July 18, 2018 



HISTORY:  This is a patient known to me who has been seeing me in my clinic 

for the past 3 weeks.  He is an alcoholic who has been attempting to quit.  

When I have seen him in the office and I asked him about it, he says he is 

trying, but he continues to return to it.  He has been somewhat 

noncompliant.  He has been ambulating on the lower extremity, but then he 

rented a scooter and he began to get off the lower extremity.  He was 

having home health 2 to 3 times a week.  I saw him last week, the wound was 

healing slowly, but it was healing fine.  There were no signs of infection. 

 Edges were coapting.  Somewhere from the time of last week to now, he 

developed an abscess, and he came in through the emergency room.  He did 

admit that he had not been taking any of the antibiotics, so he suspects he 

might need a PICC line because he is not 100% compliant.



PAST MEDICAL HISTORY:  CAD, alcoholism, diabetes, hypertension, 

hyperlipidemia, history of noncompliance.



MEDICATIONS:  Please refer to MAR.  Of interest to this consult are tatianna 

and Zosyn.



SOCIAL HISTORY:  He is an alcoholic, he admits to it.  He also smokes about 

2 packs a day.  He does work as a .  He lives at home 

with his wife.  He is the main caregiver and she has Alzheimer's disease.



REVIEW OF SYSTEMS:  Noncontributory except for right foot cellulitis with 

possible abscess.



LOWER EXTREMITY PHYSICAL EXAMINATION:  Erythema and edema extends to about 

the midfoot.  The patient states that it was extending to about past the 

ankle towards the knee.  It is beginning to localize.  There is malodor to 

the area.  Erythema and edema are consistent with cellulitis.  Not a true 

palpable abscess was felt.  The sutures are intact.  The plantar aspect of 

the wound has become necrotic probably secondary to the infection.  Pedal 

pulses are diminished.  Capillary filling time is delayed.  Intrinsic minus 

type of foot.



LABORATORY DATA:  The x-ray is negative for osteomyelitis.  White blood 

count, it is trending down, it began at 12.3, it is going down to 9.6.  

Neutrophils are also trending down 84.5 to 79.6.



ASSESSMENT:

1. Diabetic foot ulcer, grade 2.

2. Cellulitis, possible abscess.

3. History of noncompliance.

4. Peripheral vascular disease and neuropathy.

5. Alcoholism.



PLAN:  I discussed with the patient that at this point I would like for him 

to continue on IV antibiotics.  I am going to order an MRI as to rule out a 

drainable abscess.  If it is drainable, I will drain it either tomorrow or 

Friday.  I have also discussed with him that I am going to recommend IV 

antibiotic.  I think at this point secondary to the noncompliance the 

patient would not be a better candidate for a long-term acute facility.  He 

has failed outpatient.  He knows that he is a high risk for amputation.  He 

stated to me that at this point he is going to stop drinking and smoking 

and he is going to compliance as he does not want to lose any part of his 

lower extremity.  He is a high-risk candidate for surgery.  So at this 

point, I discussed with him that if IV antibiotic and local wound care 

continues to improve the wound, we will continue the route.  If the MRI is 

positive for drainable abscess, I am going to have to drain the area and he 

is aware of this.  I will order the MRI and I will continue to follow.



Thank you for letting me participate in the care of this patient.

 





DD:  07/18/2018 07:33

DT:  07/18/2018 08:27

Job#:  [id number] DR ONEIL

## 2018-07-18 NOTE — CARDIOLOGY REPORT
DATE OF STUDY:  July 17, 2018 



DOPPLER SCAN OF LOWER EXTREMITY ARTERIES



Segmental pressure measurements were not submitted for interpretation.  

Duplex scanning showed predominately triphasic and biphasic waveform except 

for the following areas:  The right posterior tibial artery in the distal 

portion, the right anterior tibial artery in the distal portion, as well as 

the distal portion of the left posterior tibial artery.  Velocity was 

elevated in the following areas:  The proximal right posterior tibial 

artery and the distal right posterior tibial artery, as well as the distal 

left anterior tibial artery.  The velocity range was in the range of 1.4 to 

1.6 meters per second.



CONCLUSION 

1.  Moderate stenosis involving the proximal right posterior tibial and the 

distal right posterior tibial arteries with velocity in range of 1.4 to 1.5 

meters per second with diminished velocity in the very distal right 

posterior tibial artery that became monophasic.  

2.  Small vessel disease involving the right anterior tibial artery with 

the middistal portion showing monophasic waveform.

3.  Moderate stenosis involving the distal left anterior tibial artery with 

velocity in range of 1.65 meters per second.



4.  Small vessel disease involving the left posterior tibial artery with 

monophasic waveform.









DD:  07/18/2018 10:16

DT:  07/18/2018 10:32

Job#:  [id number]



cc:EDNA REYES-GUERRERO, DPM MAURICE E. AKUCHIE, MD

## 2018-07-19 VITALS — SYSTOLIC BLOOD PRESSURE: 131 MMHG | DIASTOLIC BLOOD PRESSURE: 63 MMHG

## 2018-07-19 VITALS — DIASTOLIC BLOOD PRESSURE: 81 MMHG | SYSTOLIC BLOOD PRESSURE: 139 MMHG

## 2018-07-19 VITALS — DIASTOLIC BLOOD PRESSURE: 60 MMHG | SYSTOLIC BLOOD PRESSURE: 116 MMHG

## 2018-07-19 VITALS — DIASTOLIC BLOOD PRESSURE: 81 MMHG | SYSTOLIC BLOOD PRESSURE: 130 MMHG

## 2018-07-19 VITALS — DIASTOLIC BLOOD PRESSURE: 64 MMHG | SYSTOLIC BLOOD PRESSURE: 113 MMHG

## 2018-07-19 VITALS — SYSTOLIC BLOOD PRESSURE: 137 MMHG | DIASTOLIC BLOOD PRESSURE: 79 MMHG

## 2018-07-19 LAB
ANION GAP SERPL CALC-SCNC: 13 MMOL/L (ref 8–16)
BASOPHILS # BLD AUTO: 0.1 10*3/UL (ref 0–0.1)
BASOPHILS NFR BLD AUTO: 0.5 % (ref 0–1)
BUN SERPL-MCNC: 6 MG/DL (ref 7–26)
BUN/CREAT SERPL: 8 (ref 6–25)
CALCIUM SERPL-MCNC: 8.5 MG/DL (ref 8.4–10.2)
CHLORIDE SERPL-SCNC: 94 MMOL/L (ref 98–107)
CO2 SERPL-SCNC: 25 MMOL/L (ref 22–29)
DEPRECATED NEUTROPHILS # BLD AUTO: 7.9 10*3/UL (ref 2.1–6.9)
EGFRCR SERPLBLD CKD-EPI 2021: > 60 ML/MIN (ref 60–?)
EOSINOPHIL # BLD AUTO: 0.1 10*3/UL (ref 0–0.4)
EOSINOPHIL NFR BLD AUTO: 1.4 % (ref 0–6)
ERYTHROCYTE [DISTWIDTH] IN CORD BLOOD: 13.7 % (ref 11.7–14.4)
GLUCOSE SERPLBLD-MCNC: 144 MG/DL (ref 74–118)
HCT VFR BLD AUTO: 33.7 % (ref 38.2–49.6)
HGB BLD-MCNC: 11.4 G/DL (ref 14–18)
LYMPHOCYTES # BLD: 1.1 10*3/UL (ref 1–3.2)
LYMPHOCYTES NFR BLD AUTO: 10.9 % (ref 18–39.1)
MCH RBC QN AUTO: 31.3 PG (ref 28–32)
MCHC RBC AUTO-ENTMCNC: 33.8 G/DL (ref 31–35)
MCV RBC AUTO: 92.6 FL (ref 81–99)
MONOCYTES # BLD AUTO: 1.1 10*3/UL (ref 0.2–0.8)
MONOCYTES NFR BLD AUTO: 10.3 % (ref 4.4–11.3)
NEUTS SEG NFR BLD AUTO: 76.3 % (ref 38.7–80)
PLATELET # BLD AUTO: 200 X10E3/UL (ref 140–360)
POTASSIUM SERPL-SCNC: 4 MMOL/L (ref 3.5–5.1)
RBC # BLD AUTO: 3.64 X10E6/UL (ref 4.3–5.7)
SODIUM SERPL-SCNC: 128 MMOL/L (ref 136–145)

## 2018-07-19 RX ADMIN — INSULIN LISPRO SCH UNIT: 100 INJECTION, SOLUTION INTRAVENOUS; SUBCUTANEOUS at 20:15

## 2018-07-19 RX ADMIN — TAZOBACTAM SODIUM AND PIPERACILLIN SODIUM SCH MLS/HR: 375; 3 INJECTION, SOLUTION INTRAVENOUS at 05:39

## 2018-07-19 RX ADMIN — TAZOBACTAM SODIUM AND PIPERACILLIN SODIUM SCH MLS/HR: 375; 3 INJECTION, SOLUTION INTRAVENOUS at 18:39

## 2018-07-19 RX ADMIN — INSULIN LISPRO SCH UNIT: 100 INJECTION, SOLUTION INTRAVENOUS; SUBCUTANEOUS at 12:27

## 2018-07-19 RX ADMIN — NICOTINE SCH MG: 14 PATCH, EXTENDED RELEASE TRANSDERMAL at 09:14

## 2018-07-19 RX ADMIN — FUROSEMIDE SCH MG: 40 TABLET ORAL at 09:14

## 2018-07-19 RX ADMIN — INSULIN LISPRO SCH UNIT: 100 INJECTION, SOLUTION INTRAVENOUS; SUBCUTANEOUS at 16:13

## 2018-07-19 RX ADMIN — TAZOBACTAM SODIUM AND PIPERACILLIN SODIUM SCH MLS/HR: 375; 3 INJECTION, SOLUTION INTRAVENOUS at 12:21

## 2018-07-19 RX ADMIN — VANCOMYCIN HYDROCHLORIDE SCH MLS/HR: 1 INJECTION, SOLUTION INTRAVENOUS at 16:13

## 2018-07-19 RX ADMIN — METFORMIN HYDROCHLORIDE SCH MG: 850 TABLET, FILM COATED ORAL at 09:13

## 2018-07-19 RX ADMIN — TAZOBACTAM SODIUM AND PIPERACILLIN SODIUM SCH MLS/HR: 375; 3 INJECTION, SOLUTION INTRAVENOUS at 00:30

## 2018-07-19 RX ADMIN — POTASSIUM CHLORIDE SCH MEQ: 1500 TABLET, EXTENDED RELEASE ORAL at 09:14

## 2018-07-19 RX ADMIN — AMLODIPINE BESYLATE SCH MG: 5 TABLET ORAL at 16:14

## 2018-07-19 RX ADMIN — AMLODIPINE BESYLATE SCH MG: 5 TABLET ORAL at 09:14

## 2018-07-19 RX ADMIN — VANCOMYCIN HYDROCHLORIDE SCH MLS/HR: 1 INJECTION, SOLUTION INTRAVENOUS at 03:00

## 2018-07-19 RX ADMIN — INSULIN LISPRO SCH UNIT: 100 INJECTION, SOLUTION INTRAVENOUS; SUBCUTANEOUS at 07:30

## 2018-07-19 NOTE — DIAGNOSTIC IMAGING REPORT
MRI of the right forefoot without contrast.



History: Cellulitis. Abscess. Infection of the great toe. Decreased range of

motion. Pain.



Technique: Multiplanar multisequence MRI of the right foot without contrast

 

Comparison:  Radiographs 6/18/2018



Findings: Prior partial amputation of the distal first toe.



There is abnormal skin thickening and abnormal soft tissue edema about the

first toe with skin ulceration dorsally. No well-formed drainable fluid

collection/abscess is seen. There is associated cortical destruction and bone

marrow edema involving the remainder of the first toe consistent with

osteomyelitis. This is best seen on sagittal series 8 image 9 and 10. There is

a small effusion at the first metatarsophalangeal joint which could be

reactive.



Scattered degenerative changes are seen.



No acute fracture or dislocation.



Mild diffuse muscle atrophy.



No ligamentous or tendon tear.



Impression:

Findings consistent with cellulitis and osteomyelitis involving the remaining

portion of the first toe. No well-formed drainable fluid collection/abscess is

seen. The distal first metatarsal appears to be uninvolved at this time.





Signed by: Dr. Blaine George M.D. on 7/19/2018 8:32 AM

## 2018-07-20 VITALS — SYSTOLIC BLOOD PRESSURE: 119 MMHG | DIASTOLIC BLOOD PRESSURE: 60 MMHG

## 2018-07-20 VITALS — SYSTOLIC BLOOD PRESSURE: 141 MMHG | DIASTOLIC BLOOD PRESSURE: 76 MMHG

## 2018-07-20 VITALS — DIASTOLIC BLOOD PRESSURE: 80 MMHG | SYSTOLIC BLOOD PRESSURE: 133 MMHG

## 2018-07-20 VITALS — DIASTOLIC BLOOD PRESSURE: 90 MMHG | SYSTOLIC BLOOD PRESSURE: 135 MMHG

## 2018-07-20 VITALS — SYSTOLIC BLOOD PRESSURE: 137 MMHG | DIASTOLIC BLOOD PRESSURE: 88 MMHG

## 2018-07-20 VITALS — SYSTOLIC BLOOD PRESSURE: 137 MMHG | DIASTOLIC BLOOD PRESSURE: 89 MMHG

## 2018-07-20 VITALS — SYSTOLIC BLOOD PRESSURE: 130 MMHG | DIASTOLIC BLOOD PRESSURE: 73 MMHG

## 2018-07-20 RX ADMIN — SODIUM CHLORIDE SCH MLS/HR: 9 INJECTION, SOLUTION INTRAVENOUS at 03:18

## 2018-07-20 RX ADMIN — AMLODIPINE BESYLATE SCH MG: 5 TABLET ORAL at 08:50

## 2018-07-20 RX ADMIN — INSULIN LISPRO SCH UNIT: 100 INJECTION, SOLUTION INTRAVENOUS; SUBCUTANEOUS at 21:00

## 2018-07-20 RX ADMIN — TAZOBACTAM SODIUM AND PIPERACILLIN SODIUM SCH MLS/HR: 375; 3 INJECTION, SOLUTION INTRAVENOUS at 22:00

## 2018-07-20 RX ADMIN — INSULIN LISPRO SCH UNIT: 100 INJECTION, SOLUTION INTRAVENOUS; SUBCUTANEOUS at 16:30

## 2018-07-20 RX ADMIN — CHLORDIAZEPOXIDE HYDROCHLORIDE PRN MG: 25 CAPSULE ORAL at 14:15

## 2018-07-20 RX ADMIN — NICOTINE SCH MG: 14 PATCH, EXTENDED RELEASE TRANSDERMAL at 08:50

## 2018-07-20 RX ADMIN — AMLODIPINE BESYLATE SCH MG: 5 TABLET ORAL at 16:20

## 2018-07-20 RX ADMIN — VANCOMYCIN HYDROCHLORIDE SCH MLS/HR: 1 INJECTION, SOLUTION INTRAVENOUS at 03:18

## 2018-07-20 RX ADMIN — METFORMIN HYDROCHLORIDE SCH MG: 850 TABLET, FILM COATED ORAL at 08:50

## 2018-07-20 RX ADMIN — TAZOBACTAM SODIUM AND PIPERACILLIN SODIUM SCH MLS/HR: 375; 3 INJECTION, SOLUTION INTRAVENOUS at 00:36

## 2018-07-20 RX ADMIN — SODIUM CHLORIDE SCH MLS/HR: 9 INJECTION, SOLUTION INTRAVENOUS at 21:40

## 2018-07-20 RX ADMIN — INSULIN LISPRO SCH UNIT: 100 INJECTION, SOLUTION INTRAVENOUS; SUBCUTANEOUS at 12:21

## 2018-07-20 RX ADMIN — POTASSIUM CHLORIDE SCH MEQ: 1500 TABLET, EXTENDED RELEASE ORAL at 08:50

## 2018-07-20 RX ADMIN — TAZOBACTAM SODIUM AND PIPERACILLIN SODIUM SCH MLS/HR: 375; 3 INJECTION, SOLUTION INTRAVENOUS at 05:58

## 2018-07-20 RX ADMIN — VANCOMYCIN HYDROCHLORIDE SCH MLS/HR: 1 INJECTION, SOLUTION INTRAVENOUS at 15:00

## 2018-07-20 RX ADMIN — TAZOBACTAM SODIUM AND PIPERACILLIN SODIUM SCH MLS/HR: 375; 3 INJECTION, SOLUTION INTRAVENOUS at 12:46

## 2018-07-20 RX ADMIN — INSULIN LISPRO SCH UNIT: 100 INJECTION, SOLUTION INTRAVENOUS; SUBCUTANEOUS at 07:30

## 2018-07-20 RX ADMIN — FUROSEMIDE SCH MG: 40 TABLET ORAL at 08:50

## 2018-07-21 VITALS — DIASTOLIC BLOOD PRESSURE: 73 MMHG | SYSTOLIC BLOOD PRESSURE: 122 MMHG

## 2018-07-21 VITALS — SYSTOLIC BLOOD PRESSURE: 122 MMHG | DIASTOLIC BLOOD PRESSURE: 73 MMHG

## 2018-07-21 VITALS — SYSTOLIC BLOOD PRESSURE: 135 MMHG | DIASTOLIC BLOOD PRESSURE: 85 MMHG

## 2018-07-21 VITALS — DIASTOLIC BLOOD PRESSURE: 88 MMHG | SYSTOLIC BLOOD PRESSURE: 144 MMHG

## 2018-07-21 VITALS — SYSTOLIC BLOOD PRESSURE: 141 MMHG | DIASTOLIC BLOOD PRESSURE: 91 MMHG

## 2018-07-21 VITALS — DIASTOLIC BLOOD PRESSURE: 85 MMHG | SYSTOLIC BLOOD PRESSURE: 135 MMHG

## 2018-07-21 VITALS — SYSTOLIC BLOOD PRESSURE: 149 MMHG | DIASTOLIC BLOOD PRESSURE: 82 MMHG

## 2018-07-21 LAB
ANION GAP SERPL CALC-SCNC: 11.9 MMOL/L (ref 8–16)
BUN SERPL-MCNC: 6 MG/DL (ref 7–26)
BUN/CREAT SERPL: 8 (ref 6–25)
CALCIUM SERPL-MCNC: 8.6 MG/DL (ref 8.4–10.2)
CHLORIDE SERPL-SCNC: 98 MMOL/L (ref 98–107)
CO2 SERPL-SCNC: 26 MMOL/L (ref 22–29)
EGFRCR SERPLBLD CKD-EPI 2021: > 60 ML/MIN (ref 60–?)
GLUCOSE SERPLBLD-MCNC: 178 MG/DL (ref 74–118)
POTASSIUM SERPL-SCNC: 3.9 MMOL/L (ref 3.5–5.1)
SODIUM SERPL-SCNC: 132 MMOL/L (ref 136–145)

## 2018-07-21 RX ADMIN — VANCOMYCIN HYDROCHLORIDE SCH MLS/HR: 1 INJECTION, SOLUTION INTRAVENOUS at 15:00

## 2018-07-21 RX ADMIN — INSULIN LISPRO SCH UNIT: 100 INJECTION, SOLUTION INTRAVENOUS; SUBCUTANEOUS at 09:50

## 2018-07-21 RX ADMIN — INSULIN LISPRO SCH UNIT: 100 INJECTION, SOLUTION INTRAVENOUS; SUBCUTANEOUS at 21:53

## 2018-07-21 RX ADMIN — TAZOBACTAM SODIUM AND PIPERACILLIN SODIUM SCH MLS/HR: 375; 3 INJECTION, SOLUTION INTRAVENOUS at 21:53

## 2018-07-21 RX ADMIN — INSULIN LISPRO SCH UNIT: 100 INJECTION, SOLUTION INTRAVENOUS; SUBCUTANEOUS at 16:30

## 2018-07-21 RX ADMIN — AMLODIPINE BESYLATE SCH MG: 5 TABLET ORAL at 09:00

## 2018-07-21 RX ADMIN — NICOTINE SCH MG: 14 PATCH, EXTENDED RELEASE TRANSDERMAL at 09:00

## 2018-07-21 RX ADMIN — METFORMIN HYDROCHLORIDE SCH MG: 850 TABLET, FILM COATED ORAL at 08:00

## 2018-07-21 RX ADMIN — VANCOMYCIN HYDROCHLORIDE SCH MLS/HR: 1 INJECTION, SOLUTION INTRAVENOUS at 03:00

## 2018-07-21 RX ADMIN — INSULIN LISPRO SCH UNIT: 100 INJECTION, SOLUTION INTRAVENOUS; SUBCUTANEOUS at 07:30

## 2018-07-21 RX ADMIN — INSULIN LISPRO SCH UNIT: 100 INJECTION, SOLUTION INTRAVENOUS; SUBCUTANEOUS at 11:30

## 2018-07-21 RX ADMIN — TAZOBACTAM SODIUM AND PIPERACILLIN SODIUM SCH MLS/HR: 375; 3 INJECTION, SOLUTION INTRAVENOUS at 14:00

## 2018-07-21 RX ADMIN — POTASSIUM CHLORIDE SCH MEQ: 1500 TABLET, EXTENDED RELEASE ORAL at 11:11

## 2018-07-21 RX ADMIN — TAZOBACTAM SODIUM AND PIPERACILLIN SODIUM SCH MLS/HR: 375; 3 INJECTION, SOLUTION INTRAVENOUS at 06:00

## 2018-07-21 RX ADMIN — FUROSEMIDE SCH MG: 40 TABLET ORAL at 09:00

## 2018-07-21 RX ADMIN — Medication PRN MG: at 02:10

## 2018-07-21 RX ADMIN — AMLODIPINE BESYLATE SCH MG: 5 TABLET ORAL at 17:00

## 2018-07-21 RX ADMIN — SODIUM CHLORIDE SCH MLS/HR: 9 INJECTION, SOLUTION INTRAVENOUS at 17:30

## 2018-07-22 VITALS — SYSTOLIC BLOOD PRESSURE: 142 MMHG | DIASTOLIC BLOOD PRESSURE: 77 MMHG

## 2018-07-22 VITALS — SYSTOLIC BLOOD PRESSURE: 137 MMHG | DIASTOLIC BLOOD PRESSURE: 84 MMHG

## 2018-07-22 VITALS — SYSTOLIC BLOOD PRESSURE: 118 MMHG | DIASTOLIC BLOOD PRESSURE: 57 MMHG

## 2018-07-22 VITALS — DIASTOLIC BLOOD PRESSURE: 77 MMHG | SYSTOLIC BLOOD PRESSURE: 142 MMHG

## 2018-07-22 VITALS — SYSTOLIC BLOOD PRESSURE: 155 MMHG | DIASTOLIC BLOOD PRESSURE: 94 MMHG

## 2018-07-22 VITALS — DIASTOLIC BLOOD PRESSURE: 80 MMHG | SYSTOLIC BLOOD PRESSURE: 133 MMHG

## 2018-07-22 VITALS — SYSTOLIC BLOOD PRESSURE: 142 MMHG | DIASTOLIC BLOOD PRESSURE: 86 MMHG

## 2018-07-22 VITALS — SYSTOLIC BLOOD PRESSURE: 133 MMHG | DIASTOLIC BLOOD PRESSURE: 80 MMHG

## 2018-07-22 LAB
ALBUMIN SERPL-MCNC: 2.3 G/DL (ref 3.5–5)
ALBUMIN/GLOB SERPL: 0.5 {RATIO} (ref 0.8–2)
ALP SERPL-CCNC: 48 IU/L (ref 40–150)
ALT SERPL-CCNC: 26 IU/L (ref 0–55)
ANION GAP SERPL CALC-SCNC: 13.8 MMOL/L (ref 8–16)
BASE EXCESS BLDA CALC-SCNC: 1 MMOL/L (ref -2–3)
BASOPHILS # BLD AUTO: 0 10*3/UL (ref 0–0.1)
BASOPHILS NFR BLD AUTO: 0.8 % (ref 0–1)
BUN SERPL-MCNC: 5 MG/DL (ref 7–26)
BUN/CREAT SERPL: 7 (ref 6–25)
CALCIUM SERPL-MCNC: 8.6 MG/DL (ref 8.4–10.2)
CHLORIDE SERPL-SCNC: 97 MMOL/L (ref 98–107)
CO2 SERPL-SCNC: 25 MMOL/L (ref 22–29)
DEPRECATED NEUTROPHILS # BLD AUTO: 3.4 10*3/UL (ref 2.1–6.9)
EGFRCR SERPLBLD CKD-EPI 2021: > 60 ML/MIN (ref 60–?)
EOSINOPHIL # BLD AUTO: 0.3 10*3/UL (ref 0–0.4)
EOSINOPHIL NFR BLD AUTO: 6.5 % (ref 0–6)
ERYTHROCYTE [DISTWIDTH] IN CORD BLOOD: 13.8 % (ref 11.7–14.4)
GLOBULIN PLAS-MCNC: 4.4 G/DL (ref 2.3–3.5)
GLUCOSE SERPLBLD-MCNC: 160 MG/DL (ref 74–118)
HCO3 BLDA-SCNC: 24 MMOL/L (ref 23–28)
HCT VFR BLD AUTO: 34.7 % (ref 38.2–49.6)
HGB BLD-MCNC: 11.6 G/DL (ref 14–18)
LYMPHOCYTES # BLD: 0.8 10*3/UL (ref 1–3.2)
LYMPHOCYTES NFR BLD AUTO: 16.7 % (ref 18–39.1)
MCH RBC QN AUTO: 30.4 PG (ref 28–32)
MCHC RBC AUTO-ENTMCNC: 33.4 G/DL (ref 31–35)
MCV RBC AUTO: 91.1 FL (ref 81–99)
MONOCYTES # BLD AUTO: 0.4 10*3/UL (ref 0.2–0.8)
MONOCYTES NFR BLD AUTO: 7.9 % (ref 4.4–11.3)
NEUTS SEG NFR BLD AUTO: 67.5 % (ref 38.7–80)
PCO2 BLDA: 32 MMHG (ref 41–51)
PCO2 BLDA: 66 MMHG (ref 80–105)
PH BLDA: 7.48 [PH] (ref 7.31–7.41)
PLATELET # BLD AUTO: 321 X10E3/UL (ref 140–360)
POTASSIUM SERPL-SCNC: 3.8 MMOL/L (ref 3.5–5.1)
RBC # BLD AUTO: 3.81 X10E6/UL (ref 4.3–5.7)
SAO2 % BLDA: 94 % (ref 95–98)
SODIUM SERPL-SCNC: 132 MMOL/L (ref 136–145)

## 2018-07-22 RX ADMIN — VANCOMYCIN HYDROCHLORIDE SCH MLS/HR: 1 INJECTION, SOLUTION INTRAVENOUS at 16:15

## 2018-07-22 RX ADMIN — INSULIN LISPRO SCH UNIT: 100 INJECTION, SOLUTION INTRAVENOUS; SUBCUTANEOUS at 12:00

## 2018-07-22 RX ADMIN — AMLODIPINE BESYLATE SCH MG: 5 TABLET ORAL at 17:11

## 2018-07-22 RX ADMIN — FUROSEMIDE SCH MG: 40 TABLET ORAL at 08:37

## 2018-07-22 RX ADMIN — Medication PRN MG: at 21:35

## 2018-07-22 RX ADMIN — VANCOMYCIN HYDROCHLORIDE SCH MLS/HR: 1 INJECTION, SOLUTION INTRAVENOUS at 03:57

## 2018-07-22 RX ADMIN — ENOXAPARIN SODIUM SCH MG: 40 INJECTION SUBCUTANEOUS at 07:15

## 2018-07-22 RX ADMIN — TAZOBACTAM SODIUM AND PIPERACILLIN SODIUM SCH MLS/HR: 375; 3 INJECTION, SOLUTION INTRAVENOUS at 21:35

## 2018-07-22 RX ADMIN — NICOTINE SCH MG: 14 PATCH, EXTENDED RELEASE TRANSDERMAL at 08:37

## 2018-07-22 RX ADMIN — FUROSEMIDE SCH MG: 40 TABLET ORAL at 18:15

## 2018-07-22 RX ADMIN — POTASSIUM CHLORIDE SCH MEQ: 1500 TABLET, EXTENDED RELEASE ORAL at 08:37

## 2018-07-22 RX ADMIN — INSULIN LISPRO SCH UNIT: 100 INJECTION, SOLUTION INTRAVENOUS; SUBCUTANEOUS at 08:00

## 2018-07-22 RX ADMIN — TAZOBACTAM SODIUM AND PIPERACILLIN SODIUM SCH MLS/HR: 375; 3 INJECTION, SOLUTION INTRAVENOUS at 05:55

## 2018-07-22 RX ADMIN — INSULIN LISPRO SCH UNIT: 100 INJECTION, SOLUTION INTRAVENOUS; SUBCUTANEOUS at 17:12

## 2018-07-22 RX ADMIN — INSULIN LISPRO SCH UNIT: 100 INJECTION, SOLUTION INTRAVENOUS; SUBCUTANEOUS at 21:39

## 2018-07-22 RX ADMIN — METFORMIN HYDROCHLORIDE SCH MG: 850 TABLET, FILM COATED ORAL at 08:37

## 2018-07-22 RX ADMIN — TAZOBACTAM SODIUM AND PIPERACILLIN SODIUM SCH MLS/HR: 375; 3 INJECTION, SOLUTION INTRAVENOUS at 14:45

## 2018-07-22 RX ADMIN — AMLODIPINE BESYLATE SCH MG: 5 TABLET ORAL at 08:37

## 2018-07-22 NOTE — OPERATIVE REPORT
DATE OF PROCEDURE:  July 20, 2018 

 

ASSISTANT:  None



PREOPERATIVE DIAGNOSES 

1.  Osteomyelitis, right proximal phalanx. 

2.  Diabetic foot ulcer with abscess of 1st intermetatarsal space.  



POSTOPERATIVE DIAGNOSES 

1.  Osteomyelitis, right proximal phalanx. 

2.  Diabetic foot ulcer with abscess of 1st intermetatarsal space.  



PROCEDURES 

1.  Incision and drainage with debridement down to bone.

2.  Amputation of proximal phalanx, right foot.  



PATHOLOGY:  Specimen sent for pathology.  



CULTURES:  Deep cultures and sensitivity.



ANESTHESIA:  MAC anesthesia with local.



HEMOSTASIS:  None.  



ESTIMATED BLOOD LOSS:  Less than 20 mL.



MATERIALS:  Antibiotic impregnated vancomycin beads. 



PROCEDURE IN DETAIL:  Under mild sedation, the patient was brought to the 

operating room and placed on the operating table in the supine position.  

Following IV sedation, anesthesia was obtained with general anesthetic.  At 

this point, the right foot was scrubbed, prepped and draped in the usual 

aseptic manner.  It was then lowered to the table.  Attention was then 

directed to the dorsal aspect of the right foot where a linear incision was 

made overlying the nonviable tissue of the proximal phalanx into the 

intermetatarsal space.  Once this incision was made, large amount of 

purulence was expressed from the area.  Deep cultures and sensitivities 

were taken.  At this point, the area was evaluated for nonviable tissue.  

It was decided that the proximal phalanx would be removed.  It was 

nonviable at this point.  The joint was disarticulated at the 

metatarsophalangeal joint.  The metatarsal was hard, and it did appear to 

be eroded.



At this point, the ulcer seemed to track along the tunnels of the tendons.  

The extensor and the flexor tendon were then tenotomized and smoothed in a 

proximal fashion right past the metatarsophalangeal joint.  All nonviable 

tissue was removed with exception of debridement with a 15 blade and a bone 

rongeur.  Once all nonviable tissue was removed, the area was then flushed 

with copious amounts of copious irrigation.  A 3000 mL bag with Bacitracin 

was used.  All areas were then cauterized.  The wound was then packed with 

antibiotic impregnated vancomycin beads.  Betadine wet-to-dry dressing was 

applied.  A compressive dressing was applied to the area.  



The patient tolerated the procedure and anesthesia well.  The patient will 

be readmitted back into the hospital.  A wound VAC will be applied later on 

today.  The antibiotic beads will stay.  The patient knows this is the 

first stage of this surgery.  He will get any further surgery done in the 

future.  He knows he is also a high risk for a more proximal amputation 

secondary to his comorbidities and his history of noncompliance.  I am 

going to recommend possible IV antibiotics, either home or with an LTAC.  

He will do the wound VAC.  I will continue to follow.  He is to be strictly 

nonweightbearing to the right foot.  









DD:  07/20/2018 08:00

DT:  07/20/2018 08:09

Job#:  [id number]

## 2018-07-22 NOTE — DIAGNOSTIC IMAGING REPORT
EXAMINATION:  CHEST SINGLE (PORTABLE)    



INDICATION:           Shortness of breath 



COMPARISON:  None

     

FINDINGS:

TUBES and LINES:  None.



LUNGS:  Lungs are not well inflated.  There are bibasilar atelectasis.   There

is perihilar interstitial opacities, consistent with interstitial edema.



PLEURA:  No pleural effusion or pneumothorax.



HEART AND MEDIASTINUM:  Cardiac size is moderately enlarged. There are

atherosclerotic calcifications within the aorta.



BONES AND SOFT TISSUES:  No acute osseous lesion.  Soft tissues are

unremarkable.



UPPER ABDOMEN: No free air under the diaphragm.    



IMPRESSION: 

Moderate cardiogenic pulmonary edema.





Signed by: Dr. Quoc Castillo M.D. on 7/22/2018 6:44 AM

## 2018-07-23 VITALS — DIASTOLIC BLOOD PRESSURE: 83 MMHG | SYSTOLIC BLOOD PRESSURE: 131 MMHG

## 2018-07-23 VITALS — SYSTOLIC BLOOD PRESSURE: 125 MMHG | DIASTOLIC BLOOD PRESSURE: 67 MMHG

## 2018-07-23 VITALS — SYSTOLIC BLOOD PRESSURE: 122 MMHG | DIASTOLIC BLOOD PRESSURE: 69 MMHG

## 2018-07-23 VITALS — DIASTOLIC BLOOD PRESSURE: 74 MMHG | SYSTOLIC BLOOD PRESSURE: 126 MMHG

## 2018-07-23 VITALS — SYSTOLIC BLOOD PRESSURE: 127 MMHG | DIASTOLIC BLOOD PRESSURE: 75 MMHG

## 2018-07-23 VITALS — DIASTOLIC BLOOD PRESSURE: 74 MMHG | SYSTOLIC BLOOD PRESSURE: 124 MMHG

## 2018-07-23 LAB
ANION GAP SERPL CALC-SCNC: 14.4 MMOL/L (ref 8–16)
BUN SERPL-MCNC: 6 MG/DL (ref 7–26)
BUN/CREAT SERPL: 8 (ref 6–25)
CALCIUM SERPL-MCNC: 8.7 MG/DL (ref 8.4–10.2)
CHLORIDE SERPL-SCNC: 98 MMOL/L (ref 98–107)
CO2 SERPL-SCNC: 25 MMOL/L (ref 22–29)
EGFRCR SERPLBLD CKD-EPI 2021: > 60 ML/MIN (ref 60–?)
GLUCOSE SERPLBLD-MCNC: 200 MG/DL (ref 74–118)
POTASSIUM SERPL-SCNC: 3.4 MMOL/L (ref 3.5–5.1)
SODIUM SERPL-SCNC: 134 MMOL/L (ref 136–145)

## 2018-07-23 RX ADMIN — NICOTINE SCH MG: 14 PATCH, EXTENDED RELEASE TRANSDERMAL at 10:57

## 2018-07-23 RX ADMIN — INSULIN LISPRO SCH UNIT: 100 INJECTION, SOLUTION INTRAVENOUS; SUBCUTANEOUS at 20:39

## 2018-07-23 RX ADMIN — TAZOBACTAM SODIUM AND PIPERACILLIN SODIUM SCH MLS/HR: 375; 3 INJECTION, SOLUTION INTRAVENOUS at 05:55

## 2018-07-23 RX ADMIN — INSULIN LISPRO SCH UNIT: 100 INJECTION, SOLUTION INTRAVENOUS; SUBCUTANEOUS at 16:30

## 2018-07-23 RX ADMIN — FUROSEMIDE SCH MG: 40 TABLET ORAL at 05:55

## 2018-07-23 RX ADMIN — INSULIN LISPRO SCH UNIT: 100 INJECTION, SOLUTION INTRAVENOUS; SUBCUTANEOUS at 11:30

## 2018-07-23 RX ADMIN — AMLODIPINE BESYLATE SCH MG: 5 TABLET ORAL at 17:00

## 2018-07-23 RX ADMIN — TAZOBACTAM SODIUM AND PIPERACILLIN SODIUM SCH MLS/HR: 375; 3 INJECTION, SOLUTION INTRAVENOUS at 21:48

## 2018-07-23 RX ADMIN — METFORMIN HYDROCHLORIDE SCH MG: 850 TABLET, FILM COATED ORAL at 10:56

## 2018-07-23 RX ADMIN — INSULIN LISPRO SCH UNIT: 100 INJECTION, SOLUTION INTRAVENOUS; SUBCUTANEOUS at 07:30

## 2018-07-23 RX ADMIN — VANCOMYCIN HYDROCHLORIDE SCH MLS/HR: 1 INJECTION, SOLUTION INTRAVENOUS at 04:06

## 2018-07-23 RX ADMIN — AMLODIPINE BESYLATE SCH MG: 5 TABLET ORAL at 10:57

## 2018-07-23 RX ADMIN — VANCOMYCIN HYDROCHLORIDE SCH MLS/HR: 1 INJECTION, SOLUTION INTRAVENOUS at 15:00

## 2018-07-23 RX ADMIN — CHLORDIAZEPOXIDE HYDROCHLORIDE PRN MG: 25 CAPSULE ORAL at 01:45

## 2018-07-23 RX ADMIN — FUROSEMIDE SCH MG: 40 TABLET ORAL at 18:00

## 2018-07-23 RX ADMIN — TAZOBACTAM SODIUM AND PIPERACILLIN SODIUM SCH MLS/HR: 375; 3 INJECTION, SOLUTION INTRAVENOUS at 14:00

## 2018-07-23 RX ADMIN — POTASSIUM CHLORIDE SCH MEQ: 1500 TABLET, EXTENDED RELEASE ORAL at 10:57

## 2018-07-23 RX ADMIN — ENOXAPARIN SODIUM SCH MG: 40 INJECTION SUBCUTANEOUS at 10:57

## 2018-07-23 NOTE — PROGRESS NOTE
DATE:  July 23, 2018 



Patient was seen at bedside.  The wound VAC is intact.  The erythema and 

edema to the lower extremity has greatly decreased.  The streaking erythema 

is no longer present.  I discussed with him that his best bet for me is for 

him to go to an LTAC.  He will be more compliant.  He is trying to quit 

drinking and smoking and he is aware that he is at high risk for lower 

extremity amputation.  At this point I am going to recommend LTAC, continue 

the wound VAC, he will continue the IV antibiotics.  If he is not able to 

go to LTAC and cannot also go to SNF, then the other thing is to followup 

with me in the outpatient facility in the wound care center.  He will 

continue to have home health and he will continue on the wound VAC.  I will 

continue to follow.









DD:  07/23/2018 11:49

DT:  07/23/2018 12:22

Job#:  [id number] BETHANY

## 2018-07-24 VITALS — DIASTOLIC BLOOD PRESSURE: 81 MMHG | SYSTOLIC BLOOD PRESSURE: 140 MMHG

## 2018-07-24 VITALS — SYSTOLIC BLOOD PRESSURE: 129 MMHG | DIASTOLIC BLOOD PRESSURE: 78 MMHG

## 2018-07-24 VITALS — SYSTOLIC BLOOD PRESSURE: 128 MMHG | DIASTOLIC BLOOD PRESSURE: 70 MMHG

## 2018-07-24 VITALS — SYSTOLIC BLOOD PRESSURE: 121 MMHG | DIASTOLIC BLOOD PRESSURE: 93 MMHG

## 2018-07-24 VITALS — DIASTOLIC BLOOD PRESSURE: 85 MMHG | SYSTOLIC BLOOD PRESSURE: 121 MMHG

## 2018-07-24 VITALS — SYSTOLIC BLOOD PRESSURE: 131 MMHG | DIASTOLIC BLOOD PRESSURE: 76 MMHG

## 2018-07-24 VITALS — DIASTOLIC BLOOD PRESSURE: 84 MMHG | SYSTOLIC BLOOD PRESSURE: 134 MMHG

## 2018-07-24 RX ADMIN — INSULIN LISPRO SCH UNIT: 100 INJECTION, SOLUTION INTRAVENOUS; SUBCUTANEOUS at 21:21

## 2018-07-24 RX ADMIN — METFORMIN HYDROCHLORIDE SCH MG: 850 TABLET, FILM COATED ORAL at 08:00

## 2018-07-24 RX ADMIN — VANCOMYCIN HYDROCHLORIDE SCH MLS/HR: 1 INJECTION, SOLUTION INTRAVENOUS at 04:03

## 2018-07-24 RX ADMIN — VANCOMYCIN HYDROCHLORIDE SCH MLS/HR: 1 INJECTION, SOLUTION INTRAVENOUS at 15:00

## 2018-07-24 RX ADMIN — FUROSEMIDE SCH MG: 40 TABLET ORAL at 17:35

## 2018-07-24 RX ADMIN — INSULIN LISPRO SCH UNIT: 100 INJECTION, SOLUTION INTRAVENOUS; SUBCUTANEOUS at 07:30

## 2018-07-24 RX ADMIN — SODIUM CHLORIDE SCH MLS/HR: 9 INJECTION, SOLUTION INTRAVENOUS at 22:18

## 2018-07-24 RX ADMIN — TAZOBACTAM SODIUM AND PIPERACILLIN SODIUM SCH MLS/HR: 375; 3 INJECTION, SOLUTION INTRAVENOUS at 05:52

## 2018-07-24 RX ADMIN — TAZOBACTAM SODIUM AND PIPERACILLIN SODIUM SCH MLS/HR: 375; 3 INJECTION, SOLUTION INTRAVENOUS at 14:00

## 2018-07-24 RX ADMIN — AMLODIPINE BESYLATE SCH MG: 5 TABLET ORAL at 08:36

## 2018-07-24 RX ADMIN — CHLORDIAZEPOXIDE HYDROCHLORIDE PRN MG: 25 CAPSULE ORAL at 16:35

## 2018-07-24 RX ADMIN — NICOTINE SCH MG: 14 PATCH, EXTENDED RELEASE TRANSDERMAL at 08:37

## 2018-07-24 RX ADMIN — FUROSEMIDE SCH MG: 40 TABLET ORAL at 05:52

## 2018-07-24 RX ADMIN — POTASSIUM CHLORIDE SCH MEQ: 1500 TABLET, EXTENDED RELEASE ORAL at 08:36

## 2018-07-24 RX ADMIN — ENOXAPARIN SODIUM SCH MG: 40 INJECTION SUBCUTANEOUS at 08:37

## 2018-07-24 RX ADMIN — INSULIN LISPRO SCH UNIT: 100 INJECTION, SOLUTION INTRAVENOUS; SUBCUTANEOUS at 11:30

## 2018-07-24 RX ADMIN — AMLODIPINE BESYLATE SCH MG: 5 TABLET ORAL at 17:00

## 2018-07-25 VITALS — DIASTOLIC BLOOD PRESSURE: 67 MMHG | SYSTOLIC BLOOD PRESSURE: 123 MMHG

## 2018-07-25 VITALS — DIASTOLIC BLOOD PRESSURE: 82 MMHG | SYSTOLIC BLOOD PRESSURE: 134 MMHG

## 2018-07-25 VITALS — SYSTOLIC BLOOD PRESSURE: 124 MMHG | DIASTOLIC BLOOD PRESSURE: 83 MMHG

## 2018-07-25 VITALS — SYSTOLIC BLOOD PRESSURE: 142 MMHG | DIASTOLIC BLOOD PRESSURE: 92 MMHG

## 2018-07-25 VITALS — SYSTOLIC BLOOD PRESSURE: 120 MMHG | DIASTOLIC BLOOD PRESSURE: 75 MMHG

## 2018-07-25 PROCEDURE — 02HV33Z INSERTION OF INFUSION DEVICE INTO SUPERIOR VENA CAVA, PERCUTANEOUS APPROACH: ICD-10-PCS

## 2018-07-25 RX ADMIN — ENOXAPARIN SODIUM SCH MG: 40 INJECTION SUBCUTANEOUS at 08:54

## 2018-07-25 RX ADMIN — INSULIN LISPRO SCH UNIT: 100 INJECTION, SOLUTION INTRAVENOUS; SUBCUTANEOUS at 08:55

## 2018-07-25 RX ADMIN — CHLORDIAZEPOXIDE HYDROCHLORIDE PRN MG: 25 CAPSULE ORAL at 18:42

## 2018-07-25 RX ADMIN — POTASSIUM CHLORIDE SCH MEQ: 1500 TABLET, EXTENDED RELEASE ORAL at 08:54

## 2018-07-25 RX ADMIN — METFORMIN HYDROCHLORIDE SCH MG: 850 TABLET, FILM COATED ORAL at 08:54

## 2018-07-25 RX ADMIN — SODIUM CHLORIDE SCH MLS/HR: 9 INJECTION, SOLUTION INTRAVENOUS at 21:00

## 2018-07-25 RX ADMIN — INSULIN LISPRO SCH UNIT: 100 INJECTION, SOLUTION INTRAVENOUS; SUBCUTANEOUS at 21:33

## 2018-07-25 RX ADMIN — SODIUM CHLORIDE SCH MLS/HR: 9 INJECTION, SOLUTION INTRAVENOUS at 11:00

## 2018-07-25 RX ADMIN — INSULIN LISPRO SCH UNIT: 100 INJECTION, SOLUTION INTRAVENOUS; SUBCUTANEOUS at 16:37

## 2018-07-25 RX ADMIN — NICOTINE SCH MG: 14 PATCH, EXTENDED RELEASE TRANSDERMAL at 08:54

## 2018-07-25 RX ADMIN — FUROSEMIDE SCH MG: 40 TABLET ORAL at 16:36

## 2018-07-25 RX ADMIN — AMLODIPINE BESYLATE SCH MG: 5 TABLET ORAL at 08:54

## 2018-07-25 RX ADMIN — AMLODIPINE BESYLATE SCH MG: 5 TABLET ORAL at 16:36

## 2018-07-25 RX ADMIN — SODIUM CHLORIDE SCH GM: 9 INJECTION, SOLUTION INTRAVENOUS at 16:36

## 2018-07-25 RX ADMIN — SODIUM CHLORIDE SCH GM: 9 INJECTION, SOLUTION INTRAVENOUS at 05:36

## 2018-07-25 RX ADMIN — INSULIN LISPRO SCH UNIT: 100 INJECTION, SOLUTION INTRAVENOUS; SUBCUTANEOUS at 13:29

## 2018-07-25 RX ADMIN — FUROSEMIDE SCH MG: 40 TABLET ORAL at 05:36

## 2018-07-25 NOTE — DIAGNOSTIC IMAGING REPORT
EXAM: CHEST XRAY LINE PLACEMENT, AP 1 view

INDICATION: PICC

COMPARISON: AP view of the chest July 22, 2018



FINDINGS:

LINES/TUBES: Interval placement of right approach PICC with tip at the expected

location of the distal superior vena cava.



LUNGS: Mild interstitial edema and bibasilar atelectasis. 



PLEURA: No effusions or pneumothorax.



HEART AND MEDIASTINUM: Stable appearance.



BONES AND SOFT TISSUES: No acute findings. 



IMPRESSION:

Interval placement of right approach PICC with tip at the expected location of

the distal superior vena cava.







Signed by: Dr. Yudith Cerna M.D. on 7/25/2018 9:03 PM

## 2018-07-25 NOTE — PROGRESS NOTE
DATE:  July 24, 2018 



The patient was seen at bedside.  He is in good spirits.  We are waiting 

for an answer from insurance whether or not he is going to be able to 

transfer to LTAC.  If the insurance denies LTAC, he wants to go home and 

follow up with me in the outpatient facility at wound care.  I have 

discussed with him that if he is accepted into [facility], he has a better 

chance of healing the lower extremity secondary to his comorbidities.  The 

erythema and edema are localizing.  Capillary refill time is delayed in a 

_______ type of foot.  The wound VAC will be changed today by wound care.  

If he goes to [facility] for IV antibiotics and local wound care, he will 

continue with the wound VAC.  He will follow up with me afterwards.  He is 

on vanc and Zosyn, which is sensitive to the 3 organisms that he grew.  He 

is to continue to be nonweightbearing.  I discussed with him the importance 

of compliance.  I will continue to follow.  









DD:  07/24/2018 11:53

DT:  07/24/2018 14:07

Job#:  [id number]

## 2018-07-26 VITALS — SYSTOLIC BLOOD PRESSURE: 117 MMHG | DIASTOLIC BLOOD PRESSURE: 72 MMHG

## 2018-07-26 VITALS — DIASTOLIC BLOOD PRESSURE: 88 MMHG | SYSTOLIC BLOOD PRESSURE: 148 MMHG

## 2018-07-26 VITALS — DIASTOLIC BLOOD PRESSURE: 64 MMHG | SYSTOLIC BLOOD PRESSURE: 112 MMHG

## 2018-07-26 VITALS — SYSTOLIC BLOOD PRESSURE: 110 MMHG | DIASTOLIC BLOOD PRESSURE: 56 MMHG

## 2018-07-26 VITALS — DIASTOLIC BLOOD PRESSURE: 63 MMHG | SYSTOLIC BLOOD PRESSURE: 115 MMHG

## 2018-07-26 LAB
ANION GAP SERPL CALC-SCNC: 13.7 MMOL/L (ref 8–16)
BUN SERPL-MCNC: 10 MG/DL (ref 7–26)
BUN/CREAT SERPL: 13 (ref 6–25)
CALCIUM SERPL-MCNC: 8.9 MG/DL (ref 8.4–10.2)
CHLORIDE SERPL-SCNC: 102 MMOL/L (ref 98–107)
CO2 SERPL-SCNC: 24 MMOL/L (ref 22–29)
EGFRCR SERPLBLD CKD-EPI 2021: > 60 ML/MIN (ref 60–?)
GLUCOSE SERPLBLD-MCNC: 179 MG/DL (ref 74–118)
POTASSIUM SERPL-SCNC: 3.7 MMOL/L (ref 3.5–5.1)
SODIUM SERPL-SCNC: 136 MMOL/L (ref 136–145)

## 2018-07-26 RX ADMIN — NICOTINE SCH MG: 14 PATCH, EXTENDED RELEASE TRANSDERMAL at 09:00

## 2018-07-26 RX ADMIN — ENOXAPARIN SODIUM SCH MG: 40 INJECTION SUBCUTANEOUS at 09:00

## 2018-07-26 RX ADMIN — FUROSEMIDE SCH MG: 40 TABLET ORAL at 06:05

## 2018-07-26 RX ADMIN — AMLODIPINE BESYLATE SCH MG: 5 TABLET ORAL at 09:00

## 2018-07-26 RX ADMIN — INSULIN LISPRO SCH UNIT: 100 INJECTION, SOLUTION INTRAVENOUS; SUBCUTANEOUS at 11:30

## 2018-07-26 RX ADMIN — SODIUM CHLORIDE SCH GM: 9 INJECTION, SOLUTION INTRAVENOUS at 05:43

## 2018-07-26 RX ADMIN — METFORMIN HYDROCHLORIDE SCH MG: 850 TABLET, FILM COATED ORAL at 08:00

## 2018-07-26 RX ADMIN — INSULIN LISPRO SCH UNIT: 100 INJECTION, SOLUTION INTRAVENOUS; SUBCUTANEOUS at 07:30

## 2018-07-26 RX ADMIN — SODIUM CHLORIDE SCH MLS/HR: 9 INJECTION, SOLUTION INTRAVENOUS at 09:00

## 2018-07-26 RX ADMIN — POTASSIUM CHLORIDE SCH MEQ: 1500 TABLET, EXTENDED RELEASE ORAL at 09:00

## 2018-07-26 NOTE — DISCHARGE SUMMARY
CLINICAL HISTORY:  This is a 58-year-old white man admitted via the 

emergency room because of recurrent cellulitis and osteomyelitis.  Please 

refer to my previous dictation concerning details of current illness, past 

medical history, personal and social history, family history, review of 

systems, physical examination, initial laboratory studies.



HOSPITAL COURSE:  The patient was seen in consultation by Dr. Reyes, who 

had seen him before, and Dr. Harris, who also had seen him before.  The 

patient was started on intravenous antibiotics.  His wound was debrided. 

Initially we had planned for him to go to [place] for the IV antibiotics.  

However, the patient did not qualify based on his insurance.  Patient opted 

to be discharged home rather than to a SNF facility.  Outpatient IV 

antibiotics were then arranged for him.  He also has a wound pump.  At the 

time of discharge, he was taking cefepime and vancomycin.  Other 

medications at home are the same.  He was using less insulin in the 

hospital, but he insisted on a higher dose at home, possibly because of 

dietary differences.  He will follow up with his own cardiologist, Dr. Da Schwab, as well as a podiatrist, Dr. Reyes.  He was given 

activity, diet, medication and followup instructions.



DISCHARGE DIAGNOSES:  Same as on admission.





 _________________________________

MIKAYLA YAÑEZ MD



DD:  07/26/2018 10:53

DT:  07/26/2018 15:27

Job#:  [id number]

cc:FRANCOIS FERRON, M.D. EDNA REYES-GUERRERO, DPM MAURICE E. AKUCHIE, MD

## 2018-07-31 ENCOUNTER — HOSPITAL ENCOUNTER (OUTPATIENT)
Dept: HOSPITAL 88 - WCC | Age: 58
End: 2018-07-31
Attending: FAMILY MEDICINE
Payer: COMMERCIAL

## 2018-07-31 DIAGNOSIS — L97.416: ICD-10-CM

## 2018-07-31 DIAGNOSIS — E11.621: Primary | ICD-10-CM

## 2018-07-31 DIAGNOSIS — L97.516: ICD-10-CM

## 2018-08-01 ENCOUNTER — HOSPITAL ENCOUNTER (OUTPATIENT)
Dept: HOSPITAL 88 - WCC | Age: 58
End: 2018-08-01
Attending: PODIATRIST
Payer: COMMERCIAL

## 2018-08-01 DIAGNOSIS — I10: ICD-10-CM

## 2018-08-01 DIAGNOSIS — Z01.810: ICD-10-CM

## 2018-08-01 DIAGNOSIS — L97.416: ICD-10-CM

## 2018-08-01 DIAGNOSIS — I50.9: ICD-10-CM

## 2018-08-01 DIAGNOSIS — F10.20: ICD-10-CM

## 2018-08-01 DIAGNOSIS — E11.621: Primary | ICD-10-CM

## 2018-08-01 DIAGNOSIS — L97.516: ICD-10-CM

## 2018-08-03 ENCOUNTER — HOSPITAL ENCOUNTER (OUTPATIENT)
Dept: HOSPITAL 88 - RAD | Age: 58
End: 2018-08-03
Attending: FAMILY MEDICINE
Payer: COMMERCIAL

## 2018-08-03 ENCOUNTER — HOSPITAL ENCOUNTER (OUTPATIENT)
Dept: HOSPITAL 88 - WCC | Age: 58
End: 2018-08-03
Attending: PODIATRIST
Payer: COMMERCIAL

## 2018-08-03 DIAGNOSIS — F10.20: ICD-10-CM

## 2018-08-03 DIAGNOSIS — L97.516: ICD-10-CM

## 2018-08-03 DIAGNOSIS — L97.416: ICD-10-CM

## 2018-08-03 DIAGNOSIS — I50.9: ICD-10-CM

## 2018-08-03 DIAGNOSIS — Z01.810: ICD-10-CM

## 2018-08-03 DIAGNOSIS — E11.621: Primary | ICD-10-CM

## 2018-08-03 DIAGNOSIS — I10: ICD-10-CM

## 2018-08-03 DIAGNOSIS — Z01.810: Primary | ICD-10-CM

## 2018-08-03 PROCEDURE — 93306 TTE W/DOPPLER COMPLETE: CPT

## 2018-08-04 NOTE — CARDIOLOGY REPORT
DATE OF STUDY:    



ECHOCARDIOGRAM  



M-MODE:  Top normal left ventricular size.  Diminished left ventricular 

contractility.  Left ventricular hypertrophy.  Aortic sclerosis.  Normal 

mitral and tricuspid valves.  No pericardial effusion.



SECTOR SCAN:  Borderline enlarged left ventricle with diminished left 

ventricular contractility. Ejection fraction is approximately 25%.  [place] is 

dyskinetic.  Aortic valve sclerotic.  Mitral and tricuspid valves are 

normal.  There is no pericardial effusion.



CARDIAC DOPPLER STUDY WITH COLOR:  No significant aortic regurgitation or 

stenosis.  No significant tricuspid regurgitation or stenosis.



CONCLUSION

1.  Left ventricular hypertrophy with borderline dilated left ventricle 

with severely diminished left ventricular contractility.  Estimated 

ejection fraction of 25%.  The left ventricular apex is dyskinetic.  

2.  Top normal aortic root size measuring 3.7 cm with mild aortic sclerosis 

without stenosis and without aortic regurgitation.



3.  Top normal aortic root size.  Top normal left atrial size.  









DD:  08/04/2018 12:58

DT:  08/04/2018 13:09

Job#:  [id number]

cc:SANIYA ALEJANDRO MD

## 2018-08-08 ENCOUNTER — HOSPITAL ENCOUNTER (OUTPATIENT)
Dept: HOSPITAL 88 - WCC | Age: 58
End: 2018-08-08
Attending: PODIATRIST
Payer: COMMERCIAL

## 2018-08-08 DIAGNOSIS — I10: ICD-10-CM

## 2018-08-08 DIAGNOSIS — L97.416: ICD-10-CM

## 2018-08-08 DIAGNOSIS — Z01.810: ICD-10-CM

## 2018-08-08 DIAGNOSIS — E11.621: Primary | ICD-10-CM

## 2018-08-08 DIAGNOSIS — L97.516: ICD-10-CM

## 2018-08-08 DIAGNOSIS — I50.9: ICD-10-CM

## 2018-08-08 DIAGNOSIS — F10.20: ICD-10-CM

## 2018-08-10 ENCOUNTER — HOSPITAL ENCOUNTER (OUTPATIENT)
Dept: HOSPITAL 88 - WCC | Age: 58
End: 2018-08-10
Attending: INTERNAL MEDICINE
Payer: COMMERCIAL

## 2018-08-10 DIAGNOSIS — L97.416: ICD-10-CM

## 2018-08-10 DIAGNOSIS — F10.20: ICD-10-CM

## 2018-08-10 DIAGNOSIS — L97.516: ICD-10-CM

## 2018-08-10 DIAGNOSIS — E11.621: Primary | ICD-10-CM

## 2018-08-10 DIAGNOSIS — I50.9: ICD-10-CM

## 2018-08-10 DIAGNOSIS — Z01.810: ICD-10-CM

## 2018-08-10 DIAGNOSIS — I10: ICD-10-CM

## 2018-08-15 ENCOUNTER — HOSPITAL ENCOUNTER (OUTPATIENT)
Dept: HOSPITAL 88 - WCC | Age: 58
End: 2018-08-15
Attending: PODIATRIST
Payer: COMMERCIAL

## 2018-08-15 DIAGNOSIS — I50.9: ICD-10-CM

## 2018-08-15 DIAGNOSIS — I10: ICD-10-CM

## 2018-08-15 DIAGNOSIS — E11.621: Primary | ICD-10-CM

## 2018-08-15 DIAGNOSIS — L97.516: ICD-10-CM

## 2018-08-15 DIAGNOSIS — F10.20: ICD-10-CM

## 2018-08-15 DIAGNOSIS — Z01.810: ICD-10-CM

## 2018-08-15 DIAGNOSIS — L97.416: ICD-10-CM

## 2018-08-17 ENCOUNTER — HOSPITAL ENCOUNTER (OUTPATIENT)
Dept: HOSPITAL 88 - WCC | Age: 58
End: 2018-08-17
Attending: INTERNAL MEDICINE
Payer: COMMERCIAL

## 2018-08-17 DIAGNOSIS — I50.9: ICD-10-CM

## 2018-08-17 DIAGNOSIS — L97.416: ICD-10-CM

## 2018-08-17 DIAGNOSIS — L97.516: ICD-10-CM

## 2018-08-17 DIAGNOSIS — I10: ICD-10-CM

## 2018-08-17 DIAGNOSIS — F10.20: ICD-10-CM

## 2018-08-17 DIAGNOSIS — E11.621: Primary | ICD-10-CM

## 2018-08-17 DIAGNOSIS — Z01.810: ICD-10-CM

## 2018-08-22 ENCOUNTER — HOSPITAL ENCOUNTER (OUTPATIENT)
Dept: HOSPITAL 88 - WCC | Age: 58
End: 2018-08-22
Attending: FAMILY MEDICINE
Payer: COMMERCIAL

## 2018-08-22 DIAGNOSIS — I10: ICD-10-CM

## 2018-08-22 DIAGNOSIS — Z01.810: ICD-10-CM

## 2018-08-22 DIAGNOSIS — L97.416: ICD-10-CM

## 2018-08-22 DIAGNOSIS — L97.516: ICD-10-CM

## 2018-08-22 DIAGNOSIS — I50.9: ICD-10-CM

## 2018-08-22 DIAGNOSIS — F10.20: ICD-10-CM

## 2018-08-22 DIAGNOSIS — E11.621: Primary | ICD-10-CM

## 2018-08-24 ENCOUNTER — HOSPITAL ENCOUNTER (OUTPATIENT)
Dept: HOSPITAL 88 - WCC | Age: 58
End: 2018-08-24
Attending: PODIATRIST
Payer: COMMERCIAL

## 2018-08-24 DIAGNOSIS — F10.20: ICD-10-CM

## 2018-08-24 DIAGNOSIS — E11.621: Primary | ICD-10-CM

## 2018-08-24 DIAGNOSIS — Z01.810: ICD-10-CM

## 2018-08-24 DIAGNOSIS — L97.416: ICD-10-CM

## 2018-08-24 DIAGNOSIS — I10: ICD-10-CM

## 2018-08-24 DIAGNOSIS — I50.9: ICD-10-CM

## 2018-08-24 DIAGNOSIS — L97.516: ICD-10-CM

## 2018-08-29 ENCOUNTER — HOSPITAL ENCOUNTER (OUTPATIENT)
Dept: HOSPITAL 88 - WCC | Age: 58
End: 2018-08-29
Attending: PODIATRIST
Payer: COMMERCIAL

## 2018-08-29 DIAGNOSIS — Z01.810: ICD-10-CM

## 2018-08-29 DIAGNOSIS — E11.621: Primary | ICD-10-CM

## 2018-08-29 DIAGNOSIS — I10: ICD-10-CM

## 2018-08-29 DIAGNOSIS — F10.20: ICD-10-CM

## 2018-08-29 DIAGNOSIS — I50.9: ICD-10-CM

## 2018-08-29 DIAGNOSIS — L97.516: ICD-10-CM

## 2018-08-31 ENCOUNTER — HOSPITAL ENCOUNTER (OUTPATIENT)
Dept: HOSPITAL 88 - WCC | Age: 58
End: 2018-08-31
Attending: FAMILY MEDICINE
Payer: COMMERCIAL

## 2018-08-31 DIAGNOSIS — I10: ICD-10-CM

## 2018-08-31 DIAGNOSIS — Z01.810: ICD-10-CM

## 2018-08-31 DIAGNOSIS — L97.516: ICD-10-CM

## 2018-08-31 DIAGNOSIS — E11.621: Primary | ICD-10-CM

## 2018-08-31 DIAGNOSIS — F10.20: ICD-10-CM

## 2018-08-31 DIAGNOSIS — I50.9: ICD-10-CM

## 2018-09-05 ENCOUNTER — HOSPITAL ENCOUNTER (OUTPATIENT)
Dept: HOSPITAL 88 - WCC | Age: 58
End: 2018-09-05
Attending: PODIATRIST
Payer: COMMERCIAL

## 2018-09-05 DIAGNOSIS — L97.516: ICD-10-CM

## 2018-09-05 DIAGNOSIS — I10: ICD-10-CM

## 2018-09-05 DIAGNOSIS — E11.621: Primary | ICD-10-CM

## 2018-09-05 DIAGNOSIS — Z01.810: ICD-10-CM

## 2018-09-05 DIAGNOSIS — I50.9: ICD-10-CM

## 2018-09-05 DIAGNOSIS — F10.20: ICD-10-CM

## 2018-09-05 PROCEDURE — 15275 SKIN SUB GRAFT FACE/NK/HF/G: CPT

## 2018-09-12 ENCOUNTER — HOSPITAL ENCOUNTER (OUTPATIENT)
Dept: HOSPITAL 88 - WCC | Age: 58
End: 2018-09-12
Attending: PODIATRIST
Payer: COMMERCIAL

## 2018-09-12 DIAGNOSIS — I50.9: ICD-10-CM

## 2018-09-12 DIAGNOSIS — Z01.810: ICD-10-CM

## 2018-09-12 DIAGNOSIS — F10.20: ICD-10-CM

## 2018-09-12 DIAGNOSIS — E11.621: Primary | ICD-10-CM

## 2018-09-12 DIAGNOSIS — I10: ICD-10-CM

## 2018-09-12 DIAGNOSIS — L97.516: ICD-10-CM

## 2018-09-14 ENCOUNTER — HOSPITAL ENCOUNTER (OUTPATIENT)
Dept: HOSPITAL 88 - WCC | Age: 58
End: 2018-09-14
Attending: FAMILY MEDICINE
Payer: COMMERCIAL

## 2018-09-14 DIAGNOSIS — Z01.810: ICD-10-CM

## 2018-09-14 DIAGNOSIS — F10.20: ICD-10-CM

## 2018-09-14 DIAGNOSIS — I10: ICD-10-CM

## 2018-09-14 DIAGNOSIS — L97.516: ICD-10-CM

## 2018-09-14 DIAGNOSIS — E11.621: Primary | ICD-10-CM

## 2018-09-14 DIAGNOSIS — I50.9: ICD-10-CM

## 2018-09-19 ENCOUNTER — HOSPITAL ENCOUNTER (OUTPATIENT)
Dept: HOSPITAL 88 - WCC | Age: 58
End: 2018-09-19
Attending: PODIATRIST
Payer: COMMERCIAL

## 2018-09-19 DIAGNOSIS — E11.621: Primary | ICD-10-CM

## 2018-09-19 DIAGNOSIS — F10.20: ICD-10-CM

## 2018-09-19 DIAGNOSIS — I10: ICD-10-CM

## 2018-09-19 DIAGNOSIS — I50.9: ICD-10-CM

## 2018-09-19 DIAGNOSIS — Z01.810: ICD-10-CM

## 2018-09-19 DIAGNOSIS — L97.516: ICD-10-CM

## 2018-09-26 ENCOUNTER — HOSPITAL ENCOUNTER (OUTPATIENT)
Dept: HOSPITAL 88 - WCC | Age: 58
End: 2018-09-26
Attending: PODIATRIST
Payer: COMMERCIAL

## 2018-09-26 DIAGNOSIS — Z01.810: ICD-10-CM

## 2018-09-26 DIAGNOSIS — E11.621: Primary | ICD-10-CM

## 2018-09-26 DIAGNOSIS — I10: ICD-10-CM

## 2018-09-26 DIAGNOSIS — L97.516: ICD-10-CM

## 2018-09-26 DIAGNOSIS — F10.20: ICD-10-CM

## 2018-09-26 DIAGNOSIS — I50.9: ICD-10-CM

## 2018-10-03 ENCOUNTER — HOSPITAL ENCOUNTER (OUTPATIENT)
Dept: HOSPITAL 88 - WCC | Age: 58
End: 2018-10-03
Attending: FAMILY MEDICINE
Payer: COMMERCIAL

## 2018-10-03 DIAGNOSIS — L97.516: ICD-10-CM

## 2018-10-03 DIAGNOSIS — E11.621: Primary | ICD-10-CM

## 2018-10-03 DIAGNOSIS — I50.9: ICD-10-CM

## 2018-10-03 DIAGNOSIS — I10: ICD-10-CM

## 2018-10-03 DIAGNOSIS — Z01.810: ICD-10-CM

## 2018-10-03 DIAGNOSIS — F10.20: ICD-10-CM

## 2018-10-03 PROCEDURE — 87205 SMEAR GRAM STAIN: CPT

## 2018-10-03 PROCEDURE — 87186 SC STD MICRODIL/AGAR DIL: CPT

## 2018-10-03 PROCEDURE — 87071 CULTURE AEROBIC QUANT OTHER: CPT

## 2018-10-03 PROCEDURE — 87075 CULTR BACTERIA EXCEPT BLOOD: CPT

## 2018-10-10 ENCOUNTER — HOSPITAL ENCOUNTER (OUTPATIENT)
Dept: HOSPITAL 88 - WCC | Age: 58
End: 2018-10-10
Attending: FAMILY MEDICINE
Payer: COMMERCIAL

## 2018-10-10 DIAGNOSIS — F10.20: ICD-10-CM

## 2018-10-10 DIAGNOSIS — T87.43: Primary | ICD-10-CM

## 2018-10-10 DIAGNOSIS — I50.9: ICD-10-CM

## 2018-10-10 DIAGNOSIS — Z01.810: ICD-10-CM

## 2018-10-10 DIAGNOSIS — I10: ICD-10-CM

## 2018-10-10 DIAGNOSIS — L97.516: ICD-10-CM

## 2018-10-10 DIAGNOSIS — E11.621: ICD-10-CM
